# Patient Record
Sex: MALE | Race: WHITE | Employment: UNEMPLOYED | URBAN - NONMETROPOLITAN AREA
[De-identification: names, ages, dates, MRNs, and addresses within clinical notes are randomized per-mention and may not be internally consistent; named-entity substitution may affect disease eponyms.]

---

## 2020-03-30 ENCOUNTER — HOSPITAL ENCOUNTER (EMERGENCY)
Age: 25
Discharge: HOME | End: 2020-03-30
Payer: MEDICARE

## 2020-03-30 VITALS
HEART RATE: 97 BPM | DIASTOLIC BLOOD PRESSURE: 74 MMHG | BODY MASS INDEX: 21 KG/M2 | RESPIRATION RATE: 18 BRPM | TEMPERATURE: 98.2 F | HEIGHT: 71 IN | WEIGHT: 150 LBS | SYSTOLIC BLOOD PRESSURE: 132 MMHG | OXYGEN SATURATION: 94 %

## 2020-03-30 PROCEDURE — 99203 OFFICE O/P NEW LOW 30 MIN: CPT | Performed by: NURSE PRACTITIONER

## 2020-03-30 PROCEDURE — 99202 OFFICE O/P NEW SF 15 MIN: CPT

## 2020-03-30 RX ORDER — AZITHROMYCIN 250 MG/1
TABLET, FILM COATED ORAL
Qty: 6 TABLET | Refills: 0 | Status: SHIPPED | OUTPATIENT
Start: 2020-03-30

## 2020-03-30 RX ORDER — PREDNISONE 20 MG/1
40 TABLET ORAL DAILY
Qty: 10 TABLET | Refills: 0 | Status: SHIPPED | OUTPATIENT
Start: 2020-03-30 | End: 2020-04-04

## 2020-03-30 RX ORDER — ALBUTEROL SULFATE 90 UG/1
2 AEROSOL, METERED RESPIRATORY (INHALATION) EVERY 6 HOURS PRN
Qty: 1 INHALER | Refills: 0 | Status: SHIPPED | OUTPATIENT
Start: 2020-03-30

## 2020-03-30 ASSESSMENT — PAIN DESCRIPTION - FREQUENCY: FREQUENCY: INTERMITTENT

## 2020-03-30 ASSESSMENT — PAIN DESCRIPTION - LOCATION: LOCATION: GENERALIZED

## 2020-03-30 ASSESSMENT — PAIN DESCRIPTION - PROGRESSION: CLINICAL_PROGRESSION: NOT CHANGED

## 2020-03-30 ASSESSMENT — PAIN DESCRIPTION - DESCRIPTORS: DESCRIPTORS: ACHING

## 2020-03-30 ASSESSMENT — ENCOUNTER SYMPTOMS
COUGH: 1
RHINORRHEA: 1
SINUS PRESSURE: 0
SHORTNESS OF BREATH: 1
NAUSEA: 0
SORE THROAT: 0
DIARRHEA: 0
VOMITING: 0

## 2020-03-30 ASSESSMENT — PAIN DESCRIPTION - ONSET: ONSET: AWAKENED FROM SLEEP

## 2020-03-30 ASSESSMENT — PAIN DESCRIPTION - PAIN TYPE: TYPE: ACUTE PAIN

## 2020-03-30 ASSESSMENT — PAIN - FUNCTIONAL ASSESSMENT: PAIN_FUNCTIONAL_ASSESSMENT: ACTIVITIES ARE NOT PREVENTED

## 2020-03-30 ASSESSMENT — PAIN SCALES - GENERAL: PAINLEVEL_OUTOF10: 8

## 2020-03-30 NOTE — ED PROVIDER NOTES
reports that he has quit smoking. His smoking use included cigarettes. He has never used smokeless tobacco. He reports that he does not drink alcohol or use drugs. PHYSICAL EXAM     ED TRIAGE VITALS  BP: 132/74, Temp: 98.2 °F (36.8 °C), Pulse: 97, Resp: 18, SpO2: 94 %,Estimated body mass index is 20.92 kg/m² as calculated from the following:    Height as of this encounter: 5' 11\" (1.803 m). Weight as of this encounter: 150 lb (68 kg). ,No LMP for male patient. Physical Exam  Vitals signs and nursing note reviewed. Constitutional:       General: He is not in acute distress. Appearance: He is well-developed. He is not ill-appearing. HENT:      Head: Normocephalic and atraumatic. Right Ear: Tympanic membrane and ear canal normal.      Left Ear: Tympanic membrane and ear canal normal.      Nose: Congestion (Mild) present. Right Sinus: No maxillary sinus tenderness or frontal sinus tenderness. Left Sinus: No maxillary sinus tenderness or frontal sinus tenderness. Mouth/Throat:      Lips: Pink. Mouth: Mucous membranes are moist.      Pharynx: Uvula midline. No pharyngeal swelling, oropharyngeal exudate or posterior oropharyngeal erythema. Eyes:      General: Lids are normal. No scleral icterus. Conjunctiva/sclera: Conjunctivae normal.      Pupils: Pupils are equal.   Cardiovascular:      Rate and Rhythm: Normal rate and regular rhythm. Heart sounds: Normal heart sounds, S1 normal and S2 normal.   Pulmonary:      Effort: Pulmonary effort is normal. No respiratory distress. Breath sounds: Wheezing (Coarse, throughout) present. Musculoskeletal:      Comments: Normal active ROM x 4 extremities  Gait steady   Lymphadenopathy:      Comments: No head or neck adenopathy   Skin:     General: Skin is warm and dry. Findings: No rash (to exposed skin). Nails: There is no clubbing. Neurological:      General: No focal deficit present.       Mental Status: He is List as of 3/30/2020  1:54 PM          Discharge Medication List as of 3/30/2020  1:54 PM          DELLA Ferrera CNP    (Please note that portions of this note were completed with a voice recognition program. Efforts were made to edit the dictations but occasionally words are mis-transcribed.)         DELLA Ferrera - ARA  03/30/20 [id number]

## 2022-11-14 ENCOUNTER — TELEPHONE (OUTPATIENT)
Dept: FAMILY MEDICINE CLINIC | Age: 27
End: 2022-11-14

## 2022-11-15 ENCOUNTER — OFFICE VISIT (OUTPATIENT)
Dept: FAMILY MEDICINE CLINIC | Age: 27
End: 2022-11-15
Payer: MEDICARE

## 2022-11-15 VITALS
OXYGEN SATURATION: 98 % | SYSTOLIC BLOOD PRESSURE: 128 MMHG | WEIGHT: 194 LBS | HEART RATE: 82 BPM | DIASTOLIC BLOOD PRESSURE: 58 MMHG | HEIGHT: 71 IN | BODY MASS INDEX: 27.16 KG/M2

## 2022-11-15 DIAGNOSIS — M54.42 CHRONIC MIDLINE LOW BACK PAIN WITH BILATERAL SCIATICA: ICD-10-CM

## 2022-11-15 DIAGNOSIS — R45.89 DYSPHORIC MOOD: ICD-10-CM

## 2022-11-15 DIAGNOSIS — M54.41 CHRONIC MIDLINE LOW BACK PAIN WITH BILATERAL SCIATICA: ICD-10-CM

## 2022-11-15 DIAGNOSIS — F51.04 PSYCHOPHYSIOLOGICAL INSOMNIA: ICD-10-CM

## 2022-11-15 DIAGNOSIS — F41.9 ANXIETY: Primary | ICD-10-CM

## 2022-11-15 DIAGNOSIS — G89.29 CHRONIC MIDLINE LOW BACK PAIN WITH BILATERAL SCIATICA: ICD-10-CM

## 2022-11-15 PROCEDURE — G8419 CALC BMI OUT NRM PARAM NOF/U: HCPCS | Performed by: NURSE PRACTITIONER

## 2022-11-15 PROCEDURE — 4004F PT TOBACCO SCREEN RCVD TLK: CPT | Performed by: NURSE PRACTITIONER

## 2022-11-15 PROCEDURE — G8484 FLU IMMUNIZE NO ADMIN: HCPCS | Performed by: NURSE PRACTITIONER

## 2022-11-15 PROCEDURE — G8427 DOCREV CUR MEDS BY ELIG CLIN: HCPCS | Performed by: NURSE PRACTITIONER

## 2022-11-15 PROCEDURE — 99204 OFFICE O/P NEW MOD 45 MIN: CPT | Performed by: NURSE PRACTITIONER

## 2022-11-15 RX ORDER — TRAZODONE HYDROCHLORIDE 50 MG/1
50 TABLET ORAL NIGHTLY
Qty: 30 TABLET | Refills: 0 | Status: SHIPPED | OUTPATIENT
Start: 2022-11-15 | End: 2022-12-15

## 2022-11-15 RX ORDER — CITALOPRAM 20 MG/1
20 TABLET ORAL EVERY MORNING
Qty: 30 TABLET | Refills: 0 | Status: SHIPPED | OUTPATIENT
Start: 2022-11-15 | End: 2022-12-15

## 2022-11-15 SDOH — ECONOMIC STABILITY: FOOD INSECURITY: WITHIN THE PAST 12 MONTHS, YOU WORRIED THAT YOUR FOOD WOULD RUN OUT BEFORE YOU GOT MONEY TO BUY MORE.: NEVER TRUE

## 2022-11-15 SDOH — ECONOMIC STABILITY: FOOD INSECURITY: WITHIN THE PAST 12 MONTHS, THE FOOD YOU BOUGHT JUST DIDN'T LAST AND YOU DIDN'T HAVE MONEY TO GET MORE.: NEVER TRUE

## 2022-11-15 ASSESSMENT — PATIENT HEALTH QUESTIONNAIRE - PHQ9
7. TROUBLE CONCENTRATING ON THINGS, SUCH AS READING THE NEWSPAPER OR WATCHING TELEVISION: 0
9. THOUGHTS THAT YOU WOULD BE BETTER OFF DEAD, OR OF HURTING YOURSELF: 0
SUM OF ALL RESPONSES TO PHQ QUESTIONS 1-9: 9
SUM OF ALL RESPONSES TO PHQ QUESTIONS 1-9: 9
6. FEELING BAD ABOUT YOURSELF - OR THAT YOU ARE A FAILURE OR HAVE LET YOURSELF OR YOUR FAMILY DOWN: 0
8. MOVING OR SPEAKING SO SLOWLY THAT OTHER PEOPLE COULD HAVE NOTICED. OR THE OPPOSITE, BEING SO FIGETY OR RESTLESS THAT YOU HAVE BEEN MOVING AROUND A LOT MORE THAN USUAL: 1
1. LITTLE INTEREST OR PLEASURE IN DOING THINGS: 1
SUM OF ALL RESPONSES TO PHQ9 QUESTIONS 1 & 2: 3
2. FEELING DOWN, DEPRESSED OR HOPELESS: 2
3. TROUBLE FALLING OR STAYING ASLEEP: 1
5. POOR APPETITE OR OVEREATING: 3
4. FEELING TIRED OR HAVING LITTLE ENERGY: 1
SUM OF ALL RESPONSES TO PHQ QUESTIONS 1-9: 9
SUM OF ALL RESPONSES TO PHQ QUESTIONS 1-9: 9
10. IF YOU CHECKED OFF ANY PROBLEMS, HOW DIFFICULT HAVE THESE PROBLEMS MADE IT FOR YOU TO DO YOUR WORK, TAKE CARE OF THINGS AT HOME, OR GET ALONG WITH OTHER PEOPLE: 1

## 2022-11-15 ASSESSMENT — ENCOUNTER SYMPTOMS
SINUS PRESSURE: 0
CONSTIPATION: 0
EYE DISCHARGE: 0
ABDOMINAL PAIN: 0
SHORTNESS OF BREATH: 0
BLOOD IN STOOL: 0
EYE REDNESS: 0
EYE PAIN: 0
EYE ITCHING: 0
COLOR CHANGE: 0
BACK PAIN: 1
DIARRHEA: 0
COUGH: 0
WHEEZING: 0

## 2022-11-15 ASSESSMENT — ANXIETY QUESTIONNAIRES
4. TROUBLE RELAXING: 3-NEARLY EVERY DAY
2. NOT BEING ABLE TO STOP OR CONTROL WORRYING: 3-NEARLY EVERY DAY
GAD7 TOTAL SCORE: 17
6. BECOMING EASILY ANNOYED OR IRRITABLE: 3-NEARLY EVERY DAY
3. WORRYING TOO MUCH ABOUT DIFFERENT THINGS: 3-NEARLY EVERY DAY
1. FEELING NERVOUS, ANXIOUS, OR ON EDGE: 2
7. FEELING AFRAID AS IF SOMETHING AWFUL MIGHT HAPPEN: 0-NOT AT ALL
5. BEING SO RESTLESS THAT IT IS HARD TO SIT STILL: 3-NEARLY EVERY DAY

## 2022-11-15 ASSESSMENT — SOCIAL DETERMINANTS OF HEALTH (SDOH): HOW HARD IS IT FOR YOU TO PAY FOR THE VERY BASICS LIKE FOOD, HOUSING, MEDICAL CARE, AND HEATING?: NOT VERY HARD

## 2022-11-15 NOTE — PROGRESS NOTES
[facility]  [facility]  [address] [facility]  Dept: [phone]  Dept Fax: [phone]: [phone]     Visit Date:  11/15/2022    Patient:  Latonia Stone  YOB: 1995  Age: 32 y.o. Gender: male  BMI: Body mass index is 27.06 kg/m². Latonia Stone, New patient, is being seen today for   Chief Complaint   Patient presents with    Established New Doctor     Back pain, bipolar disorder    . Assessment/Plan      1. Anxiety  - New  - Start ssri treatment for treatment of anxiety  - Encouraged initiation of counseling services- plans to contact [facility]  - Medication side effects discussed with patient. If SI is to arise, patient is to d/c medication immediately and to contact a family member/ friend/ or suicide hotline. Patient is also to contact the office to notify us of medication side effects. - citalopram (CELEXA) 20 MG tablet; Take 1 tablet by mouth every morning  Dispense: 30 tablet; Refill: 0    2. Dysphoric mood  - New  - Start ssri treatment and trazodone for treatment of dysphoric mood  - Encouraged initiation of counseling services- plans to contact [facility]  - Medication side effects discussed with patient. If SI is to arise, patient is to d/c medication immediately and to contact a family member/ friend/ or suicide hotline. Patient is also to contact the office to notify us of medication side effects. - citalopram (CELEXA) 20 MG tablet; Take 1 tablet by mouth every morning  Dispense: 30 tablet; Refill: 0  - traZODone (DESYREL) 50 MG tablet; Take 1 tablet by mouth nightly  Dispense: 30 tablet; Refill: 0    3. Psychophysiological insomnia  - Start trazodone for treatment of insomnia  - Sleep maintenance strategies encouraged  - traZODone (DESYREL) 50 MG tablet; Take 1 tablet by mouth nightly  Dispense: 30 tablet; Refill: 0    4.  Chronic midline low back pain with bilateral sciatica  - Chronic  - Further evaluate with imaging  - PT ordered  - NSAID and acetaminophen treatment as needed for pain  - XR LUMBAR SPINE (2-3 VIEWS); Future  - [facility] [place]    Return in about 1 month (around 12/15/2022) for Depression, Anxiety. HPI:     Patient presents today for a new patient appointment. No recent pcp. Health maintenance reviewed. Medical history is negative. No current specialists. Current concerns include back pain and psychiatric conditions. Feels overwhelmed at times. Anxiety. Talks to his significant other which helps at times. Symptoms are worse in the afternoon. Inciting events or triggers for anxiety - always present   Frequency of anxiety - daily  Panic attacks? Yes  Symptoms of panic attacks -  feelings of losing control, irritable, psychomotor agitation, and racing thoughts  Sleep Disturbances? Yes  Impaired concentration? Yes  Substance abuse? Yes - marijuana use for relief of anxiety symptoms  Suicidal/Homicidal Ideation? No  Sees therapist?: No  Family History of Mental Illness? Yes - mother    Chronic back pain. Midline. Lower back. Sharp. Spasms. 8/10. Using tylenol and ibuprofen. No previous injuries. Pain is worsening. Using marijuana to help with pain. PHQ Scores 11/15/2022   PHQ2 Score 3   PHQ9 Score 9     Interpretation of Total Score Depression Severity: 1-4 = Minimal depression, 5-9 = Mild depression, 10-14 = Moderate depression, 15-19 = Moderately severe depression, 20-27 = Severe depression    FABIAN 7 SCORE 11/15/2022   FABIAN-7 Total Score 17     Interpretation of FABIAN-7 score: 5-9 = mild anxiety, 10-14 = moderate anxiety, 15+ = severe anxiety. Recommend referral to behavioral health for scores 10 or greater.       Medications    Current Outpatient Medications:     citalopram (CELEXA) 20 MG tablet, Take 1 tablet by mouth every morning, Disp: 30 tablet, Rfl: 0    traZODone (DESYREL) 50 MG tablet, Take 1 tablet by mouth nightly, Disp: 30 tablet, Rfl: 0    The patient has No Known Allergies. Past Medical History  Jihan Islas  has no past medical history on file. Past Surgical History  The patient  has a past surgical history that includes hernia repair (2009). Family History  This patient's family history includes COPD in his mother; Diabetes in his sister; Heart Disease in his father and mother. Social History  Jihan Islas  reports that he has been smoking cigarettes. He has been smoking an average of .5 packs per day. He has never used smokeless tobacco. He reports current drug use. Frequency: 7.00 times per week. Drug: Marijuana [place]). He reports that he does not drink alcohol. Health Maintenance:    Health maintenance reviewed. Health Maintenance   Topic Date Due    COVID-19 Vaccine (1) Never done    Varicella vaccine (1 of 2 - 2-dose childhood series) Never done    Depression Screen  Never done    HIV screen  Never done    Hepatitis C screen  Never done    DTaP/Tdap/Td vaccine (1 - Tdap) Never done    Flu vaccine (1) Never done    Hepatitis A vaccine  Aged Out    Hib vaccine  Aged Out    Meningococcal (ACWY) vaccine  Aged Out    Pneumococcal 0-64 years Vaccine  Aged Out       Subjective/Objective:      Review of Systems   Constitutional:  Negative for chills, fatigue and fever. HENT:  Negative for congestion, ear pain, sinus pressure and sneezing. Eyes:  Negative for pain, discharge, redness and itching. Respiratory:  Negative for cough, shortness of breath and wheezing. Cardiovascular:  Negative for chest pain, palpitations and leg swelling. Gastrointestinal:  Negative for abdominal pain, blood in stool, constipation and diarrhea. Endocrine: Negative for polydipsia, polyphagia and polyuria. Genitourinary:  Negative for difficulty urinating and hematuria. Musculoskeletal:  Positive for back pain. Negative for arthralgias and neck pain. Skin:  Negative for color change, pallor and rash.    Allergic/Immunologic: Negative for environmental allergies and food allergies. Neurological:  Negative for dizziness, light-headedness, numbness and headaches. Psychiatric/Behavioral:  Positive for agitation, decreased concentration, dysphoric mood and sleep disturbance. Negative for confusion, self-injury and suicidal ideas. The patient is nervous/anxious. BP (!) 128/58   Pulse 82   Ht 5' 11\" (1.803 m)   Wt 194 lb (88 kg)   SpO2 98%   BMI 27.06 kg/m²     Physical Exam  Vitals and nursing note reviewed. Constitutional:       General: He is awake. Appearance: Normal appearance. HENT:      Head: Normocephalic and atraumatic. Right Ear: Hearing and external ear normal.      Left Ear: Hearing and external ear normal.      Nose: Nose normal. No congestion or rhinorrhea. Eyes:      General: Lids are normal.         Right eye: No discharge. Left eye: No discharge. Conjunctiva/sclera: Conjunctivae normal.   Neck:      Trachea: No tracheal deviation. Cardiovascular:      Rate and Rhythm: Normal rate and regular rhythm. Heart sounds: Normal heart sounds. No murmur heard. Pulmonary:      Effort: Pulmonary effort is normal. No respiratory distress. Breath sounds: No stridor. No wheezing. Musculoskeletal:      Cervical back: Full passive range of motion without pain. Lumbar back: Tenderness present. No deformity or bony tenderness. Back:    Skin:     General: Skin is dry. Coloration: Skin is not jaundiced or pale. Neurological:      General: No focal deficit present. Mental Status: He is alert. Mental status is at baseline. Psychiatric:         Mood and Affect: Affect normal. Mood is anxious. Behavior: Behavior is cooperative. Thought Content: Thought content does not include suicidal ideation.          Labs Reviewed 11/15/2022:    Lab Results   Component Value Date    WBC 6.1 01/18/2015    HGB 14.4 01/18/2015    HCT 42.8 01/18/2015     01/18/2015    CHOL 117 10/06/2011 TRIG 38 10/06/2011    HDL 40 10/06/2011    ALT 21 01/18/2015    AST 21 01/18/2015     01/18/2015    K 4.1 01/18/2015     01/18/2015    CREATININE 0.5 01/18/2015    BUN 11 01/18/2015    CO2 29 01/18/2015    TSH 1.510 01/18/2015           Patient given educational materials - see patient instructions. Discussed use, benefit, and side effects of prescribed medications. All patient questions answered. Pt voiced understanding. Reviewed health maintenance.        Electronically signed by DELLA Higgins CNP on 11/15/2022 at 2:37 PM EST

## 2022-11-16 ENCOUNTER — HOSPITAL ENCOUNTER (OUTPATIENT)
Dept: GENERAL RADIOLOGY | Age: 27
Discharge: HOME | End: 2022-11-16
Payer: MEDICARE

## 2022-11-16 ENCOUNTER — TELEPHONE (OUTPATIENT)
Dept: FAMILY MEDICINE CLINIC | Age: 27
End: 2022-11-16

## 2022-11-16 ENCOUNTER — HOSPITAL ENCOUNTER (OUTPATIENT)
Age: 27
Discharge: HOME | End: 2022-11-16
Payer: MEDICARE

## 2022-11-16 DIAGNOSIS — G89.29 CHRONIC MIDLINE LOW BACK PAIN WITH BILATERAL SCIATICA: ICD-10-CM

## 2022-11-16 DIAGNOSIS — M54.41 CHRONIC MIDLINE LOW BACK PAIN WITH BILATERAL SCIATICA: ICD-10-CM

## 2022-11-16 DIAGNOSIS — M54.42 CHRONIC MIDLINE LOW BACK PAIN WITH BILATERAL SCIATICA: ICD-10-CM

## 2022-11-16 PROCEDURE — 72100 X-RAY EXAM L-S SPINE 2/3 VWS: CPT

## 2022-11-16 NOTE — TELEPHONE ENCOUNTER
----- Message from Romana Roads, APRN - CNP sent at 11/16/2022  1:16 PM EST -----  No acute fracture or malalignment noted on the completed xray. Did mention lower lumbar facet arthrosis at L5-S1 with suggested possible neuroforaminal narrowing at L5-S1. This likely is contributing to pain. Continue with current POC. Consider ortho referral if no improvement in symptoms.

## 2022-12-14 ENCOUNTER — TELEPHONE (OUTPATIENT)
Dept: FAMILY MEDICINE CLINIC | Age: 27
End: 2022-12-14

## 2022-12-14 ENCOUNTER — HOSPITAL ENCOUNTER (OUTPATIENT)
Dept: PHYSICAL THERAPY | Age: 27
Setting detail: THERAPIES SERIES
Discharge: HOME | End: 2022-12-14
Payer: MEDICARE

## 2022-12-14 DIAGNOSIS — F51.04 PSYCHOPHYSIOLOGICAL INSOMNIA: ICD-10-CM

## 2022-12-14 DIAGNOSIS — R45.89 DYSPHORIC MOOD: ICD-10-CM

## 2022-12-14 DIAGNOSIS — F41.9 ANXIETY: ICD-10-CM

## 2022-12-14 PROCEDURE — 97110 THERAPEUTIC EXERCISES: CPT

## 2022-12-14 PROCEDURE — 97162 PT EVAL MOD COMPLEX 30 MIN: CPT

## 2022-12-14 PROCEDURE — G0283 ELEC STIM OTHER THAN WOUND: HCPCS

## 2022-12-14 RX ORDER — CITALOPRAM 20 MG/1
20 TABLET ORAL EVERY MORNING
Qty: 7 TABLET | Refills: 0 | Status: SHIPPED | OUTPATIENT
Start: 2022-12-14 | End: 2022-12-21

## 2022-12-14 RX ORDER — TRAZODONE HYDROCHLORIDE 50 MG/1
50 TABLET ORAL NIGHTLY
Qty: 7 TABLET | Refills: 0 | Status: SHIPPED | OUTPATIENT
Start: 2022-12-14 | End: 2022-12-21

## 2022-12-14 NOTE — TELEPHONE ENCOUNTER
Patient came in stating that he needs a refill of his CELEXA 20 mg sent to [facility] in [place]. He will be out before his appointment on Monday he only has 4 left. Also he stated that his TRAZODONE 50 MG works but it doesn't help him sleep all night. He is wanting to know if that could maybe be upped.

## 2022-12-14 NOTE — PROGRESS NOTES
** PLEASE SIGN, DATE AND TIME CERTIFICATION BELOW AND RETURN TO [facility] OUTPATIENT REHABILITATION (FAX #: [phone]). ATTEST/CO-SIGN IF ACCESSING VIA [employer]. THANK YOU.**    I certify that I have examined the patient below and determined that Physical Medicine and Rehabilitation service is necessary and that I approve the established plan of care for up to 90 days or as specifically noted. Attestation, signature or co-signature of physician indicates approval of certification requirements.    ________________________ ____________ __________  Physician Signature   Date   Time   [address] [facility]  PHYSICAL THERAPY  [x] EVALUATION  [] DAILY NOTE (LAND) [] DAILY NOTE (AQUATIC ) [] PROGRESS NOTE [] DISCHARGE NOTE    [] [address] [place] [address]   [] [place] [address]    [] [facility]   [] [facility] See    Date: 2022  Patient Name:  Karina Zheng  : 1995  MRN: [id number]  CSN: [id number]    Referring Practitioner DELLA Reece*   Diagnosis Chronic midline low back pain with bilateral sciatica   Treatment Diagnosis LBP, difficulty walking   Date of Evaluation 22   Additional Pertinent History Negative, LBP x 5 years      Functional Outcome Measure Used Oswestry back disability scale   Functional Outcome Score Eval score 33 (22)       Insurance: Primary: Payor: Josef Gomez /  /  / ,   Secondary:    Authorization Information: INSURANCE PAYS AT: 100%               PATIENT RESPONSIBILITY AND/OR CO-PAY: n/a  SECONDARY INSURANCE COMPANY: NONE      PRECERTIFICATION REQUIRED:  n/a  INSURANCE THERAPY BENEFIT:  Allowed 30 visits Physical Therapy /Occupational Therapy/Speech Therapy per calendar year. No visit limit for PT/OT/ST for patients age 8 and under. FCE-Covered, no precert required. Benefit will not cover maintenance or preventative treatment. AQUATIC THERAPY COVERED:   Yes  MODALITIES COVERED:  Yes. Iontophoresis and Hot/Cold Packs are not covered. Visit # 1, 1/10 for progress note   Visits Allowed: 30   Recertification Date: 1/56/45   Physician Follow-Up: Needs to schedule f/u with Rony Mari   Physician Orders:    History of Present Illness: 5 year history of LBP with insidious onset with patient unsure of onset or cause, reports \"I was doing a lot of drugs, went to group home \". Patient out of group home Jan 14, 2022. Patient started see Cici Head as family MD, due to back pain and depression. Xray for LBP. Patient reports back pain is severe in morning, pain awakens patient 2-3 x per night. Increased pain after walking 30 minutes, increased pain getting in/out car, increased pain bending over to put socks and shoes on . SUBJECTIVE: see above    Social/Functional History and Current Status:  Medications and Allergies have been reviewed and are listed on Medical History Questionnaire. Marcelino Infante lives with significant other in a single story home with stairs and a handrail to enter.     Task Previous Current   ADLs  Independent Modified Independent increased pain bending over to put socks and shoes on   IADL's Independent Modified Independent severe pain with walking x 10 minutes   Ambulation Independent Modified Independent   Transfers Independent Modified Independent  increased pain getting in/out of car   Recreation Independent Modified Independent enjoys playing video games, does help clean house or garage   [address] [facility]  Does not drive does not have liscence since in group home   Work Unemployed  Unemployed       OBJECTIVE:  Pain % of the day , 10/10, average 7/10   Palpation    Observation    Posture fair        Range of Motion Back: lumbar flexion 75%, extension 25% and did not want to move this direction, lateral flexion B limited by 50%  Hip: B hip flexion 40 with LBP  Knee: B knee 0 -120 degrees with pain with flexion  Ankle: B ankle DF 10, PF 40 degrees    Strength Right Lower Extremity:  Impaired - hip 3/5 with LBP with MMT, knee 4-/5 with LBP with MMT, ankle 4-/5 with LBP with MMT with poor abdominal strength noted  Left Lower Extremity:   Impaired - hip 3/5, knee and ankle 4-/5   Coordination WFL   Sensation WFL   Bed Mobility Impaired - eduction on log rolling   Transfers WFL   Ambulation Modified Independent  Distance: 100 feet  Surface: Level Tile  Device:No Device  Gait Deviations: Forward Flexed Posture, Slow Teri, and Decreased Trunk Rotation   Balance Tinetti: 24/28   Special Tests Repeated lumbar flexion standing increased LBP to 8/10, refused extension, negative Cristopher test           TREATMENT   Precautions:  Ionto/MHP/CP not covered by insurance   Pain:     \"X in shaded column indicates activity completed today    *\" next to exercise/intervention indicates progression   Modalities Parameters/  Location  Notes   IFC 2 channels miriam crossed at lumbar spine, supine, knees on bolster 8 minutes, 6 mA intensity x                Manual Therapy Time/Technique  Notes                     Exercise/Intervention   Notes   Abdominal bracing knees on bolster 5x 5s x    Abdominal bracing with quad set R/L, opposite knee bent 5x 5s x    SAQ alternating legs 5x 5s x    Education on posture with lumbar lordosis   x    Education on log rolling   x                                                Specific Interventions Next Treatment: IFC for pain control, DLSP in neutral position supine and seated, progress to light hamstring stretch seated in chair- avoid increased pain, progress to NuStep and standing exercises after 2 weeks    Activity/Treatment Tolerance:  []  Patient tolerated treatment well  []  Patient limited by fatigue  [x]  Patient limited by pain   []  Patient limited by medical complications  []  Other:     Assessment: PT for exercises and modalities to increase AROM, strength, and lessen LBP  Body Structures/Functions/Activity Limitations: impaired ROM, impaired strength, and pain  Prognosis: good    GOALS:  Patient Goal: to get my pain to go away    Short Term Goals:  Time Frame: deferred to LTG's    Long Term Goals:  Time Frame: 5 weeks  Increase lumbar spine to 75%, hip flexion to 60, knee flexion to 130 degrees to allow patient to grocery shop x 20 minutes with decreased pain to 3/10  Increase abdominal strength to fair, LE to 4-/5 to allow patient to report able to dress and shower with decreased pain to 3/10  I with HEP as prescribed to allow patient to report able to sleep through night without awakening due to LBP    Patient Education:   [x]  HEP/Education Completed: Plan of Care, Goals, handout with above exercises given  [email or web address] Access Code:  []  No new Education completed  []  Reviewed Prior HEP      []  Patient verbalized and/or demonstrated understanding of education provided. []  Patient unable to verbalize and/or demonstrate understanding of education provided. Will continue education. [x]  Barriers to learning: give handouts, poor memory admitted    PLAN:  Treatment Recommendations: Strengthening, Range of Motion, Gait Training, Pain Management, Home Exercise Program, Patient Education, and Modalities    [x]  Plan of care initiated. Plan to see patient 2 times per week for 5 weeks to address the treatment planned outlined above.   []  Continue with current plan of care  []  Modify plan of care as follows:    []  Hold pending physician visit  []  Discharge    Time In 1410   Time Out 1500   Timed Code Minutes: 15 min   Total Treatment Time: 50 min       Electronically Signed by: Ayo Kelly PT

## 2022-12-16 ENCOUNTER — HOSPITAL ENCOUNTER (OUTPATIENT)
Dept: PHYSICAL THERAPY | Age: 27
Setting detail: THERAPIES SERIES
Discharge: HOME | End: 2022-12-16
Payer: MEDICARE

## 2022-12-16 PROCEDURE — 97110 THERAPEUTIC EXERCISES: CPT

## 2022-12-16 NOTE — PROGRESS NOTES
[address] [facility]  PHYSICAL THERAPY  [] EVALUATION  [x] DAILY NOTE (LAND) [] DAILY NOTE (AQUATIC ) [] PROGRESS NOTE [] DISCHARGE NOTE    [] OUTPATIENT REHABILITATION [facility]   [] [place] [address]    [] [facility]   [] Bernadette Chintan    Date: 2022  Patient Name:  Silver Paiz  : 1995  MRN: [id number]  CSN: [id number]    Referring Practitioner DELLA Nagel*   Diagnosis Chronic midline low back pain with bilateral sciatica   Treatment Diagnosis LBP, difficulty walking   Date of Evaluation 22   Additional Pertinent History Negative, LBP x 5 years      Functional Outcome Measure Used Oswestry back disability scale   Functional Outcome Score Eval score 33 (22)       Insurance: Primary: Payor: Agapito Foster /  /  / ,   Secondary:    Authorization Information: INSURANCE PAYS AT: 100%               PATIENT RESPONSIBILITY AND/OR CO-PAY: n/a  SECONDARY INSURANCE COMPANY: NONE      PRECERTIFICATION REQUIRED:  n/a  INSURANCE THERAPY BENEFIT:  Allowed 30 visits Physical Therapy /Occupational Therapy/Speech Therapy per calendar year. No visit limit for PT/OT/ST for patients age 8 and under. FCE-Covered, no precert required. Benefit will not cover maintenance or preventative treatment. AQUATIC THERAPY COVERED:   Yes  MODALITIES COVERED:  Yes. Iontophoresis and Hot/Cold Packs are not covered. Visit # 2, 2/10 for progress note   Visits Allowed: 30   Recertification Date:    Physician Follow-Up: Needs to schedule f/u with Nuno Baeza   Physician Orders:    History of Present Illness: 5 year history of LBP with insidious onset with patient unsure of onset or cause, reports \"I was doing a lot of drugs, went to residential \". Patient out of residential 2022. Patient started see Jolene Phelps as family MD, due to back pain and depression. Xray for LBP. Patient reports back pain is severe in morning, pain awakens patient 2-3 x per night. Increased pain after walking 30 minutes, increased pain getting in/out car, increased pain bending over to put socks and shoes on . SUBJECTIVE: Subjective reports of bilateral lower extremity numbness and moderate low back pain, rates 6/10 upon initial patient interview. OBJECTIVE:    TREATMENT   Precautions: Ionto/MHP/CP not covered by insurance   Pain:     \"X in shaded column indicates activity completed today    *\" next to exercise/intervention indicates progression   Modalities Parameters/  Location  Notes   IFC 2 channels miriam crossed at lumbar spine, supine, knees on bolster 8 minutes, 6 mA intensity X                Manual Therapy Time/Technique  Notes                     Exercise/Intervention   Notes   Abdominal bracing knees on bolster 10x 5s X    Abdominal bracing with quad set R/L, opposite knee bent 10x 5s X    SAQ alternating legs 10x 5s X    Education on posture with lumbar lordosis       Education on log rolling                                                   Specific Interventions Next Treatment: IFC for pain control, DLSP in neutral position supine and seated, progress to light hamstring stretch seated in chair- avoid increased pain, progress to NuStep and standing exercises after 2 weeks    Activity/Treatment Tolerance:  []  Patient tolerated treatment well  []  Patient limited by fatigue  [x]  Patient limited by pain   []  Patient limited by medical complications  []  Other:     Assessment: Treatment interventions completed as recorded above. Additional interventions marked by * in chart. Patient tolerated treatment well.     GOALS:  Patient Goal: to get my pain to go away    Short Term Goals:  Time Frame: deferred to LTG's    Long Term Goals:  Time Frame: 5 weeks  Increase lumbar spine to 75%, hip flexion to 60, knee flexion to 130 degrees to allow patient to grocery shop x 20 minutes with decreased pain to 3/10  Increase abdominal strength to fair, LE to 4-/5 to allow patient to report able to dress and shower with decreased pain to 3/10  I with HEP as prescribed to allow patient to report able to sleep through night without awakening due to LBP    Patient Education:   []  HEP/Education Completed: Plan of Care, Goals, handout with above exercises given  [employer] Access Code:  [x]  No new Education completed  []  Reviewed Prior HEP      []  Patient verbalized and/or demonstrated understanding of education provided. []  Patient unable to verbalize and/or demonstrate understanding of education provided. Will continue education. [x]  Barriers to learning: give handouts, poor memory admitted    PLAN:  Treatment Recommendations: Strengthening, Range of Motion, Gait Training, Pain Management, Home Exercise Program, Patient Education, and Modalities    []  Plan of care initiated. Plan to see patient 2 times per week for 5 weeks to address the treatment planned outlined above.   [x]  Continue with current plan of care  []  Modify plan of care as follows:    []  Hold pending physician visit  []  Discharge    Time In 1530   Time Out 1600   Timed Code Minutes: 30 min   Total Treatment Time: 30 min       Electronically Signed by: Sheree Arango PTA

## 2022-12-19 ENCOUNTER — OFFICE VISIT (OUTPATIENT)
Dept: FAMILY MEDICINE CLINIC | Age: 27
End: 2022-12-19
Payer: MEDICARE

## 2022-12-19 VITALS
DIASTOLIC BLOOD PRESSURE: 82 MMHG | OXYGEN SATURATION: 98 % | BODY MASS INDEX: 26.5 KG/M2 | WEIGHT: 190 LBS | SYSTOLIC BLOOD PRESSURE: 128 MMHG | HEART RATE: 62 BPM

## 2022-12-19 DIAGNOSIS — R45.89 DYSPHORIC MOOD: ICD-10-CM

## 2022-12-19 DIAGNOSIS — F41.9 ANXIETY: Primary | ICD-10-CM

## 2022-12-19 DIAGNOSIS — F51.04 PSYCHOPHYSIOLOGICAL INSOMNIA: ICD-10-CM

## 2022-12-19 PROCEDURE — 99214 OFFICE O/P EST MOD 30 MIN: CPT | Performed by: NURSE PRACTITIONER

## 2022-12-19 PROCEDURE — 4004F PT TOBACCO SCREEN RCVD TLK: CPT | Performed by: NURSE PRACTITIONER

## 2022-12-19 PROCEDURE — G8484 FLU IMMUNIZE NO ADMIN: HCPCS | Performed by: NURSE PRACTITIONER

## 2022-12-19 PROCEDURE — G8419 CALC BMI OUT NRM PARAM NOF/U: HCPCS | Performed by: NURSE PRACTITIONER

## 2022-12-19 PROCEDURE — G8427 DOCREV CUR MEDS BY ELIG CLIN: HCPCS | Performed by: NURSE PRACTITIONER

## 2022-12-19 RX ORDER — CITALOPRAM 20 MG/1
20 TABLET ORAL EVERY MORNING
Qty: 90 TABLET | Refills: 1 | Status: SHIPPED | OUTPATIENT
Start: 2022-12-19 | End: 2023-06-17

## 2022-12-19 RX ORDER — TRAZODONE HYDROCHLORIDE 100 MG/1
100 TABLET ORAL NIGHTLY
Qty: 30 TABLET | Refills: 5 | Status: SHIPPED | OUTPATIENT
Start: 2022-12-19 | End: 2023-06-17

## 2022-12-19 NOTE — PROGRESS NOTES
Coni Hyde (:  1995) is a 32 y.o. male,Established patient, here for evaluation of the following chief complaint(s):  Follow-up (Troubles with staying asleep, anxiety better )         ASSESSMENT/PLAN:  1. Anxiety  - Chronic, controlled  - Patient reports significant improvement in symptoms  - Continue citalopram 20 mg  -     citalopram (CELEXA) 20 MG tablet; Take 1 tablet by mouth every morning, Disp-90 tablet, R-1Normal    2. Dysphoric mood  - Chronic, controlled  - Patient reports significant improvement in symptoms  - Continue citalopram 20 mg and trazodone  -     citalopram (CELEXA) 20 MG tablet; Take 1 tablet by mouth every morning, Disp-90 tablet, R-1Normal  -     traZODone (DESYREL) 100 MG tablet; Take 1 tablet by mouth nightly, Disp-30 tablet, R-5Normal    3. Psychophysiological insomnia  - Increase trazodone to 100 mg nightly  - Sleep maintenance strategies encouraged  -     traZODone (DESYREL) 100 MG tablet; Take 1 tablet by mouth nightly, Disp-30 tablet, R-5Normal    Return in about 6 months (around 2023) for Depression, Anxiety. Subjective   SUBJECTIVE/OBJECTIVE:  1 month follow up for anxiety, depression and insomnia. Anxiety and depression have improved. Insomnia improved but not resolved. Is taking medication at 9 pm. Goes to bed at 11 pm. Waking around 3-4 am. Unable to fall back asleep. 4 hours of sleep last night. Reports significant amount of screen time throughout the day and night. Denies medication SE. Review of Systems   Constitutional:  Negative for fever. HENT:  Positive for congestion. Psychiatric/Behavioral:  Positive for sleep disturbance. Negative for dysphoric mood. The patient is not nervous/anxious. Objective   Physical Exam  Vitals and nursing note reviewed. Constitutional:       General: He is awake. Appearance: Normal appearance. HENT:      Head: Normocephalic and atraumatic.       Right Ear: Hearing and external ear normal. Left Ear: Hearing and external ear normal.      Nose: Nose normal. No congestion or rhinorrhea. Eyes:      General: Lids are normal.         Right eye: No discharge. Left eye: No discharge. Conjunctiva/sclera: Conjunctivae normal.   Neck:      Trachea: No tracheal deviation. Cardiovascular:      Rate and Rhythm: Normal rate and regular rhythm. Heart sounds: Normal heart sounds. No murmur heard. Pulmonary:      Effort: Pulmonary effort is normal. No respiratory distress. Breath sounds: No stridor. No wheezing. Musculoskeletal:      Cervical back: Full passive range of motion without pain. Skin:     General: Skin is dry. Coloration: Skin is not jaundiced or pale. Neurological:      General: No focal deficit present. Mental Status: He is alert. Mental status is at baseline. Psychiatric:         Mood and Affect: Mood and affect normal. Mood is not anxious or depressed. Behavior: Behavior is cooperative. An electronic signature was used to authenticate this note.     --DELLA William - CNP

## 2022-12-20 ENCOUNTER — APPOINTMENT (OUTPATIENT)
Dept: PHYSICAL THERAPY | Age: 27
End: 2022-12-20
Payer: MEDICARE

## 2022-12-22 ENCOUNTER — HOSPITAL ENCOUNTER (OUTPATIENT)
Dept: PHYSICAL THERAPY | Age: 27
Setting detail: THERAPIES SERIES
Discharge: HOME | End: 2022-12-22
Payer: MEDICARE

## 2022-12-22 PROCEDURE — 97110 THERAPEUTIC EXERCISES: CPT

## 2022-12-22 NOTE — PROGRESS NOTES
[address] [facility]  PHYSICAL THERAPY  [] EVALUATION  [x] DAILY NOTE (LAND) [] DAILY NOTE (AQUATIC ) [] PROGRESS NOTE [] DISCHARGE NOTE    [] OUTPATIENT REHABILITATION [facility]   [] [place] [address]    [] [facility]   [] [facility]     Date: 2022  Patient Name:  Michelle Peck  : 1995  MRN: [id number]  CSN: [id number]    Referring Practitioner DELLA Hill*   Diagnosis Chronic midline low back pain with bilateral sciatica   Treatment Diagnosis LBP, difficulty walking   Date of Evaluation 22   Additional Pertinent History Negative, LBP x 5 years      Functional Outcome Measure Used Oswestry back disability scale   Functional Outcome Score Eval score 33 (22)       Insurance: Primary: Payor: Shamar Willingham /  /  / ,   Secondary:    Authorization Information: INSURANCE PAYS AT: 100%               PATIENT RESPONSIBILITY AND/OR CO-PAY: n/a  SECONDARY INSURANCE COMPANY: NONE      PRECERTIFICATION REQUIRED:  n/a  INSURANCE THERAPY BENEFIT:  Allowed 30 visits Physical Therapy /Occupational Therapy/Speech Therapy per calendar year. No visit limit for PT/OT/ST for patients age 8 and under. FCE-Covered, no precert required. Benefit will not cover maintenance or preventative treatment. AQUATIC THERAPY COVERED:   Yes  MODALITIES COVERED:  Yes. Iontophoresis and Hot/Cold Packs are not covered. Visit # 3, 3/10 for progress note   Visits Allowed: 30   Recertification Date:    Physician Follow-Up: Needs to schedule f/u with Edel Arteaga   Physician Orders:    History of Present Illness: 5 year history of LBP with insidious onset with patient unsure of onset or cause, reports \"I was doing a lot of drugs, [facility] \". Patient [facility] of penitentiary 2022. Patient started see Felisha Costa as family MD, due to back pain and depression. Xray for LBP. Patient reports back pain is severe in morning, pain awakens patient 2-3 x per night. Increased pain after walking 30 minutes, increased pain getting in/out car, increased pain bending over to put socks and shoes on . SUBJECTIVE: Continued subjective reports of bilateral lower extremity numbness and moderate low back pain, rates 6/10 upon initial patient interview. Patient also reported pain in anterior right ribcage. Location patient described and indicated with hand was in the Liver region. Patient advised to monitor for any changes and consult doctor if worsens. OBJECTIVE:    TREATMENT   Precautions: Ionto/MHP/CP not covered by insurance   Pain:     \"X in shaded column indicates activity completed today    *\" next to exercise/intervention indicates progression   Modalities Parameters/  Location  Notes   IFC 2 channels miriam crossed at lumbar spine, supine, knees on bolster 8 minutes, 6 mA intensity x                Manual Therapy Time/Technique  Notes                     Exercise/Intervention   Notes   Abdominal bracing knees on bolster 10x 5s x    Abdominal bracing with quad set R/L, opposite knee bent 10x 5s x    SAQ alternating legs 10x 5s x    LTR *   x    SKTC *   x                  Education on posture with lumbar lordosis       Education on log rolling                                                   Specific Interventions Next Treatment: IFC for pain control, DLSP in neutral position supine and seated, progress to light hamstring stretch seated in chair- avoid increased pain, progress to NuStep and standing exercises after 2 weeks    Activity/Treatment Tolerance:  []  Patient tolerated treatment well  []  Patient limited by fatigue  [x]  Patient limited by pain   []  Patient limited by medical complications  []  Other:     Assessment: Treatment interventions completed as recorded above. Additional interventions marked by * in chart. Patient tolerated treatment well.     GOALS:  Patient Goal: to get my pain to go away    Short Term Goals:  Time Frame: deferred to LTG's    Long Term Goals:  Time Frame: 5 weeks  Increase lumbar spine to 75%, hip flexion to 60, knee flexion to 130 degrees to allow patient to grocery shop x 20 minutes with decreased pain to 3/10  Increase abdominal strength to fair, LE to 4-/5 to allow patient to report able to dress and shower with decreased pain to 3/10  I with HEP as prescribed to allow patient to report able to sleep through night without awakening due to LBP    Patient Education:   []  HEP/Education Completed: Plan of Care, Goals, handout with above exercises given  [employer] Access Code:  [x]  No new Education completed  []  Reviewed Prior HEP      []  Patient verbalized and/or demonstrated understanding of education provided. []  Patient unable to verbalize and/or demonstrate understanding of education provided. Will continue education. [x]  Barriers to learning: give handouts, poor memory admitted    PLAN:  Treatment Recommendations: Strengthening, Range of Motion, Gait Training, Pain Management, Home Exercise Program, Patient Education, and Modalities    []  Plan of care initiated. Plan to see patient 2 times per week for 5 weeks to address the treatment planned outlined above.   [x]  Continue with current plan of care  []  Modify plan of care as follows:    []  Hold pending physician visit  []  Discharge    Time In 1400   Time Out 1430   Timed Code Minutes: 30 min   Total Treatment Time: 30 min       Electronically Signed by: Nely Wayne PTA

## 2022-12-22 NOTE — PROGRESS NOTES
[address] [facility]  PHYSICAL THERAPY  [] EVALUATION  [x] DAILY NOTE (LAND) [] DAILY NOTE (AQUATIC ) [] PROGRESS NOTE [] DISCHARGE NOTE    [] OUTPATIENT REHABILITATION [facility]   [] [place] [address]    [] [facility]   [] Roddy Morillo    Date: 2022  Patient Name:  Madyson Salazar  : 1995  MRN: [id number]  CSN: [id number]    Referring Practitioner DELLA Lai*   Diagnosis Chronic midline low back pain with bilateral sciatica   Treatment Diagnosis LBP, difficulty walking   Date of Evaluation 22   Additional Pertinent History Negative, LBP x 5 years      Functional Outcome Measure Used Oswestry back disability scale   Functional Outcome Score Eval score 33 (22)       Insurance: Primary: Payor: Ysabel Massey /  /  / ,   Secondary:    Authorization Information: INSURANCE PAYS AT: 100%               PATIENT RESPONSIBILITY AND/OR CO-PAY: n/a  SECONDARY INSURANCE COMPANY: NONE      PRECERTIFICATION REQUIRED:  n/a  INSURANCE THERAPY BENEFIT:  Allowed 30 visits Physical Therapy /Occupational Therapy/Speech Therapy per calendar year. No visit limit for PT/OT/ST for patients age 8 and under. FCE-Covered, no precert required. Benefit will not cover maintenance or preventative treatment. AQUATIC THERAPY COVERED:   Yes  MODALITIES COVERED:  Yes. Iontophoresis and Hot/Cold Packs are not covered. Visit # 3, 3/10 for progress note   Visits Allowed: 30   Recertification Date:    Physician Follow-Up: Needs to schedule f/u with Ying Billy   Physician Orders:    History of Present Illness: 5 year history of LBP with insidious onset with patient unsure of onset or cause, reports \"I was doing a lot of drugs, went to penit[facility] \". Patient out of penitentiary 2022. Patient started see Tiffanie Joshua as family MD, due to back pain and depression. Xray for LBP. Patient reports back pain is severe in morning, pain awakens patient 2-3 x per night. Increased pain after walking 30 minutes, increased pain getting in/out car, increased pain bending over to put socks and shoes on . SUBJECTIVE: Continued subjective reports of bilateral lower extremity numbness and moderate low back pain, rates 6/10 upon initial patient interview. OBJECTIVE:    TREATMENT   Precautions: Ionto/MHP/CP not covered by insurance   Pain:     \"X in shaded column indicates activity completed today    *\" next to exercise/intervention indicates progression   Modalities Parameters/  Location  Notes   IFC 2 channels miriam crossed at lumbar spine, supine, knees on bolster 8 minutes, 6 mA intensity X                Manual Therapy Time/Technique  Notes                     Exercise/Intervention   Notes   Abdominal bracing knees on bolster 10x 5s x    Abdominal bracing with quad set R/L, opposite knee bent 10x 5s x    SAQ alternating legs 10x 5s x    LTR *   x    SKTC *   x                  Education on posture with lumbar lordosis       Education on log rolling                                                   Specific Interventions Next Treatment: IFC for pain control, DLSP in neutral position supine and seated, progress to light hamstring stretch seated in chair- avoid increased pain, progress to NuStep and standing exercises after 2 weeks    Activity/Treatment Tolerance:  []  Patient tolerated treatment well  []  Patient limited by fatigue  [x]  Patient limited by pain   []  Patient limited by medical complications  []  Other:     Assessment: Treatment interventions completed as recorded above. Additional interventions marked by * in chart. Patient tolerated treatment well.     GOALS:  Patient Goal: to get my pain to go away    Short Term Goals:  Time Frame: deferred to LTG's    Long Term Goals:  Time Frame: 5 weeks  Increase lumbar spine to 75%, hip flexion to 60, knee flexion to 130 degrees to allow patient to grocery shop x 20 minutes with decreased pain to 3/10  Increase abdominal strength to fair, LE to 4-/5 to allow patient to report able to dress and shower with decreased pain to 3/10  I with HEP as prescribed to allow patient to report able to sleep through night without awakening due to LBP    Patient Education:   []  HEP/Education Completed: Plan of Care, Goals, handout with above exercises given  [employer] Access Code:  [x]  No new Education completed  []  Reviewed Prior HEP      []  Patient verbalized and/or demonstrated understanding of education provided. []  Patient unable to verbalize and/or demonstrate understanding of education provided. Will continue education. [x]  Barriers to learning: give handouts, poor memory admitted    PLAN:  Treatment Recommendations: Strengthening, Range of Motion, Gait Training, Pain Management, Home Exercise Program, Patient Education, and Modalities    []  Plan of care initiated. Plan to see patient 2 times per week for 5 weeks to address the treatment planned outlined above.   [x]  Continue with current plan of care  []  Modify plan of care as follows:    []  Hold pending physician visit  []  Discharge    Time In 1400   Time Out 1430   Timed Code Minutes: 30 min   Total Treatment Time: 30 min       Electronically Signed by: Mikal Gusman PTA

## 2022-12-28 ENCOUNTER — HOSPITAL ENCOUNTER (OUTPATIENT)
Dept: PHYSICAL THERAPY | Age: 27
Setting detail: THERAPIES SERIES
Discharge: HOME | End: 2022-12-28
Payer: MEDICARE

## 2022-12-28 PROCEDURE — 97110 THERAPEUTIC EXERCISES: CPT

## 2022-12-28 NOTE — PROGRESS NOTES
[address] [facility]  PHYSICAL THERAPY  [] EVALUATION  [x] DAILY NOTE (LAND) [] DAILY NOTE (AQUATIC ) [] PROGRESS NOTE [] DISCHARGE NOTE    [] OUTPATIENT REHABILITATION [facility]   [] [place] [address]    [] [facility]   [] [facility]    Date: 2022  Patient Name:  Mike Ochoa  : 1995  MRN: [id number]  CSN: [id number]    Referring Practitioner DELLA Leiva*   Diagnosis Chronic midline low back pain with bilateral sciatica   Treatment Diagnosis LBP, difficulty walking   Date of Evaluation 22   Additional Pertinent History Negative, LBP x 5 years      Functional Outcome Measure Used Oswestry back disability scale   Functional Outcome Score Eval score 33 (22)       Insurance: Primary: Payor: Jose Becerra /  /  / ,   Secondary:    Authorization Information: INSURANCE PAYS AT: 100%               PATIENT RESPONSIBILITY AND/OR CO-PAY: n/a  SECONDARY INSURANCE COMPANY: NONE      PRECERTIFICATION REQUIRED:  n/a  INSURANCE THERAPY BENEFIT:  Allowed 30 visits Physical Therapy /Occupational Therapy/Speech Therapy per calendar year. No visit limit for PT/OT/ST for patients age 8 and under. FCE-Covered, no precert required. Benefit will not cover maintenance or preventative treatment. AQUATIC THERAPY COVERED:   Yes  MODALITIES COVERED:  Yes. Iontophoresis and Hot/Cold Packs are not covered. Visit # 4, 4/10 for progress note   Visits Allowed: 30   Recertification Date: 40   Physician Follow-Up: Needs to schedule f/u with Alex Baer   Physician Orders:    History of Present Illness: 5 year history of LBP with insidious onset with patient unsure of onset or cause, reports \"I was doing a lot of drugs, went to skilled nursing \". Patient out of skilled nursing 2022. Patient started see Marti Kemp as family MD, due to back pain and depression. Xray for LBP. Patient reports back pain is severe in morning, pain awakens patient 2-3 x per night. Increased pain after walking 30 minutes, increased pain getting in/out car, increased pain bending over to put socks and shoes on . SUBJECTIVE: Subjective reports of continued aching in the low back upon initial patient interview. OBJECTIVE:    TREATMENT   Precautions: Ionto/MHP/CP not covered by insurance   Pain:     \"X in shaded column indicates activity completed today    *\" next to exercise/intervention indicates progression   Modalities Parameters/  Location  Notes   IFC 2 channels miriam crossed at lumbar spine, supine, knees on bolster 8 minutes, 6 mA intensity X                Manual Therapy Time/Technique  Notes                     Exercise/Intervention   Notes   Abdominal bracing knees on bolster 10x 5s x    Abdominal bracing with quad set R/L, opposite knee bent 10x 5s x    SAQ alternating legs 10x 5s x    LTR  5x 5sec  x    SKTC  5x 5sec  x    Seated LAQ * 10x ea  x    Seated multidirectional dive stretch * 3x15 ea  x           Education on posture with lumbar lordosis       Education on log rolling                                                   Specific Interventions Next Treatment: IFC for pain control, DLSP in neutral position supine and seated, progress to light hamstring stretch seated in chair- avoid increased pain, progress to NuStep and standing exercises after 2 weeks    Activity/Treatment Tolerance:  []  Patient tolerated treatment well  []  Patient limited by fatigue  [x]  Patient limited by pain   []  Patient limited by medical complications  []  Other:     Assessment: Treatment interventions completed as recorded above. Additional interventions marked by * in chart. Patient tolerated treatment well.     GOALS:  Patient Goal: to get my pain to go away    Short Term Goals:  Time Frame: deferred to LT's    Long Term Goals:  Time Frame: 5 weeks  Increase lumbar spine to 75%, hip flexion to 60, knee flexion to 130 degrees to allow patient to grocery shop x 20 minutes with decreased pain to 3/10  Increase abdominal strength to fair, LE to 4-/5 to allow patient to report able to dress and shower with decreased pain to 3/10  I with HEP as prescribed to allow patient to report able to sleep through night without awakening due to LBP    Patient Education:   []  HEP/Education Completed: Plan of Care, Goals, handout with above exercises given  [employer] Access Code:  [x]  No new Education completed  []  Reviewed Prior HEP      []  Patient verbalized and/or demonstrated understanding of education provided. []  Patient unable to verbalize and/or demonstrate understanding of education provided. Will continue education. [x]  Barriers to learning: give handouts, poor memory admitted    PLAN:  Treatment Recommendations: Strengthening, Range of Motion, Gait Training, Pain Management, Home Exercise Program, Patient Education, and Modalities    []  Plan of care initiated. Plan to see patient 2 times per week for 5 weeks to address the treatment planned outlined above.   [x]  Continue with current plan of care  []  Modify plan of care as follows:    []  Hold pending physician visit  []  Discharge    Time In [id number] [phone] (Late)   Time Out 1430   Timed Code Minutes: 21 min   Total Treatment Time: 21 min       Electronically Signed by: Bessie Henderson PTA

## 2022-12-30 ENCOUNTER — HOSPITAL ENCOUNTER (OUTPATIENT)
Dept: PHYSICAL THERAPY | Age: 27
Setting detail: THERAPIES SERIES
Discharge: HOME | End: 2022-12-30
Payer: MEDICARE

## 2022-12-30 PROCEDURE — 97110 THERAPEUTIC EXERCISES: CPT

## 2022-12-30 PROCEDURE — 97032 APPL MODALITY 1+ESTIM EA 15: CPT

## 2022-12-30 NOTE — PROGRESS NOTES
[address] [facility]  PHYSICAL THERAPY  [] EVALUATION  [x] DAILY NOTE (LAND) [] DAILY NOTE (AQUATIC ) [] PROGRESS NOTE [] DISCHARGE NOTE    [] [facility]   [x] [place]     [] [facility]   [] Asher Grier    Date: 2022  Patient Name:  Giselle Man  : 1995  MRN: [id number]  CSN: [id number]    Referring Practitioner DELLA Krishnamurthy*   Diagnosis Chronic midline low back pain with bilateral sciatica   Treatment Diagnosis LBP, difficulty walking   Date of Evaluation 22   Additional Pertinent History Negative, LBP x 5 years      Functional Outcome Measure Used Oswestry back disability scale   Functional Outcome Score Eval score 33 (22)       Insurance: Primary: Payor: Sergio Wen /  /  / ,   Secondary:    Authorization Information: INSURANCE PAYS AT: 100%               PATIENT RESPONSIBILITY AND/OR CO-PAY: n/a  SECONDARY INSURANCE COMPANY: NONE      PRECERTIFICATION REQUIRED:  n/a  INSURANCE THERAPY BENEFIT:  Allowed 30 visits Physical Therapy /Occupational Therapy/Speech Therapy per calendar year. No visit limit for PT/OT/ST for patients age 8 and under. FCE-Covered, no precert required. Benefit will not cover maintenance or preventative treatment. AQUATIC THERAPY COVERED:   Yes  MODALITIES COVERED:  Yes. Iontophoresis and Hot/Cold Packs are not covered. Visit # 5, 5/10 for progress note   Visits Allowed: 30   Recertification Date:    Physician Follow-Up: Needs to schedule f/u with Ema Martinez   Physician Orders:    History of Present Illness: 5 year history of LBP with insidious onset with patient unsure of onset or cause, reports \"I was doing a lot of drugs, went to [facility] \". Patient out of [facility] 2022. Patient started see Frederick Mckay as family MD, due to back pain and depression. Xray for LBP.   Patient reports back pain is severe in morning, pain awakens patient 2-3 x per night.   Increased pain after walking 30 minutes, increased pain getting in/out car, increased pain bending over to put socks and shoes on . SUBJECTIVE: Subjective reports of waking up with less pain recently. OBJECTIVE:    TREATMENT   Precautions: Ionto/MHP/CP not covered by insurance   Pain:     \"X in shaded column indicates activity completed today    *\" next to exercise/intervention indicates progression   Modalities Parameters/  Location  Notes   IFC 2 channels miriam crossed at lumbar spine, supine, knees on bolster 8 minutes, 6 mA intensity x                Manual Therapy Time/Technique  Notes                     Exercise/Intervention   Notes   Abdominal bracing knees on bolster 10x 5s     Abdominal bracing with quad set R/L, opposite knee bent 10x 5s     SAQ alternating legs 10x 5s     LTR  5x 5sec      SKTC  5x 5sec      Seated marching * 10x  x    Seated LAQ  10x ea  x    Seated multidirectional dive stretch  3x15 ea  x    Seated piriformis stretch * 3x15 ea  x    Seated Hamstring Stretch * 3x15 ea  x    Education on posture with lumbar lordosis       Education on log rolling                                                   Specific Interventions Next Treatment: IFC for pain control, DLSP in neutral position supine and seated, progress to light hamstring stretch seated in chair- avoid increased pain, progress to NuStep and standing exercises after 2 weeks    Activity/Treatment Tolerance:  []  Patient tolerated treatment well  []  Patient limited by fatigue  [x]  Patient limited by pain   []  Patient limited by medical complications  []  Other:     Assessment: Treatment interventions completed as recorded above. Additional interventions marked by * in chart. Patient tolerated treatment well.     GOALS:  Patient Goal: to get my pain to go away    Short Term Goals:  Time Frame: deferred to LT's    Long Term Goals:  Time Frame: 5 weeks  Increase lumbar spine to 75%, hip flexion to 60, knee flexion to 130 degrees to allow patient to grocery shop x 20 minutes with decreased pain to 3/10  Increase abdominal strength to fair, LE to 4-/5 to allow patient to report able to dress and shower with decreased pain to 3/10  I with HEP as prescribed to allow patient to report able to sleep through night without awakening due to LBP    Patient Education:   []  HEP/Education Completed: Plan of Care, Goals, handout with above exercises given  [employer] Access Code:  [x]  No new Education completed  []  Reviewed Prior HEP      []  Patient verbalized and/or demonstrated understanding of education provided. []  Patient unable to verbalize and/or demonstrate understanding of education provided. Will continue education. [x]  Barriers to learning: give handouts, poor memory admitted    PLAN:  Treatment Recommendations: Strengthening, Range of Motion, Gait Training, Pain Management, Home Exercise Program, Patient Education, and Modalities    []  Plan of care initiated. Plan to see patient 2 times per week for 5 weeks to address the treatment planned outlined above.   [x]  Continue with current plan of care  []  Modify plan of care as follows:    []  Hold pending physician visit  []  Discharge    Time In 1430   Time Out 1500   Timed Code Minutes: 30 min   Total Treatment Time: 30 min       Electronically Signed by: Sulema Peace PTA

## 2023-01-03 ENCOUNTER — HOSPITAL ENCOUNTER (OUTPATIENT)
Dept: PHYSICAL THERAPY | Age: 28
Setting detail: THERAPIES SERIES
Discharge: HOME | End: 2023-01-03
Payer: MEDICAID

## 2023-01-03 ENCOUNTER — APPOINTMENT (OUTPATIENT)
Dept: PHYSICAL THERAPY | Age: 28
End: 2023-01-03
Payer: MEDICAID

## 2023-01-03 PROCEDURE — 97032 APPL MODALITY 1+ESTIM EA 15: CPT

## 2023-01-03 PROCEDURE — 97110 THERAPEUTIC EXERCISES: CPT

## 2023-01-03 NOTE — PROGRESS NOTES
[address] [facility]  PHYSICAL THERAPY  [] EVALUATION  [x] DAILY NOTE (LAND) [] DAILY NOTE (AQUATIC ) [] PROGRESS NOTE [] DISCHARGE NOTE    [] [address] [facility]   [x] [place] [address]    [] [facility]   [] Bernardino Delong    Date: 1/3/2023  Patient Name:  aMlena Burk  : 1995  MRN: [id number]  CSN: [id number]    Referring Practitioner DELLA Daily*   Diagnosis Chronic midline low back pain with bilateral sciatica   Treatment Diagnosis LBP, difficulty walking   Date of Evaluation 22   Additional Pertinent History Negative, LBP x 5 years      Functional Outcome Measure Used Oswestry back disability scale   Functional Outcome Score Eval score 33 (22)       Insurance: Primary: Payor: [place] /  /  / ,   Secondary:    Authorization Information: INSURANCE PAYS AT: 100%               PATIENT RESPONSIBILITY AND/OR CO-PAY: n/a  SECONDARY INSURANCE COMPANY: NONE      PRECERTIFICATION REQUIRED:  n/a  INSURANCE THERAPY BENEFIT:  Allowed 30 visits Physical Therapy /Occupational Therapy/Speech Therapy per calendar year. No visit limit for PT/OT/ST for patients age 8 and under. FCE-Covered, no precert required. Benefit will not cover maintenance or preventative treatment. AQUATIC THERAPY COVERED:   Yes  MODALITIES COVERED:  Yes. Iontophoresis and Hot/Cold Packs are not covered. Visit # 7, 7/10 for progress note   Visits Allowed: 30   Recertification Date:    Physician Follow-Up: Needs to schedule f/u with Levon Blue   Physician Orders:    History of Present Illness: 5 year history of LBP with insidious onset with patient unsure of onset or cause, reports \"I was doing a lot of drugs, went to senior care \". Patient out of senior care 2022. Patient started see Lord Mac as family MD, due to back pain and depression. Xray for LBP. Patient reports back pain is severe in morning, pain awakens patient 2-3 x per night. Increased pain after walking 30 minutes, increased pain getting in/out car, increased pain bending over to put socks and shoes on . SUBJECTIVE: Subjective reports of continued pain in the low back, though patient reports that it has lessened slightly. Rates LPB 7/10 upon initial patient interview this date. OBJECTIVE:    TREATMENT   Precautions: Ionto/MHP/CP not covered by insurance   Pain:     \"X in shaded column indicates activity completed today    *\" next to exercise/intervention indicates progression   Modalities Parameters/  Location  Notes   IFC 2 channels miriam crossed at lumbar spine, supine, knees on bolster 8 minutes, 6 mA intensity x                Manual Therapy Time/Technique  Notes                     Exercise/Intervention   Notes   Abdominal bracing knees on bolster 10x 5s     Abdominal bracing with quad set R/L, opposite knee bent 10x 5s     SAQ alternating legs 10x 5s     LTR  5x 5sec      SKTC  5x 5sec      Seated marching  15x  X    Seated LAQ  10x ea  X    Seated multidirectional dive stretch  3x15 ea  X    Seated piriformis stretch  3x15 ea  X    Seated Hamstring Stretch  3x15 ea  X    Education on posture with lumbar lordosis       Education on log rolling                                                   Specific Interventions Next Treatment: IFC for pain control, DLSP in neutral position supine and seated, progress to light hamstring stretch seated in chair- avoid increased pain, progress to NuStep and standing exercises after 2 weeks    Activity/Treatment Tolerance:  []  Patient tolerated treatment well  []  Patient limited by fatigue  [x]  Patient limited by pain   []  Patient limited by medical complications  []  Other:     Assessment: Treatment interventions completed as recorded above. Additional interventions marked by * in chart. Patient tolerated treatment well.     GOALS:  Patient Goal: to get my pain to go away    Short Term Goals:  Time Frame: deferred to LTG's    Long Term Goals:  Time Frame: 5 weeks  Increase lumbar spine to 75%, hip flexion to 60, knee flexion to 130 degrees to allow patient to grocery shop x 20 minutes with decreased pain to 3/10  Increase abdominal strength to fair, LE to 4-/5 to allow patient to report able to dress and shower with decreased pain to 3/10  I with HEP as prescribed to allow patient to report able to sleep through night without awakening due to LBP    Patient Education:   []  HEP/Education Completed: Plan of Care, Goals, handout with above exercises given  [facility] [facility] Access Code:  [x]  No new Education completed  []  Reviewed Prior HEP      []  Patient verbalized and/or demonstrated understanding of education provided. []  Patient unable to verbalize and/or demonstrate understanding of education provided. Will continue education. [x]  Barriers to learning: give handouts, poor memory admitted    PLAN:  Treatment Recommendations: Strengthening, Range of Motion, Gait Training, Pain Management, Home Exercise Program, Patient Education, and Modalities    []  Plan of care initiated. Plan to see patient 2 times per week for 5 weeks to address the treatment planned outlined above.   [x]  Continue with current plan of care  []  Modify plan of care as follows:    []  Hold pending physician visit  []  Discharge    Time In 1538   Time Out 1600   Timed Code Minutes: 22 min   Total Treatment Time: 22 min       Electronically Signed by: Radha Carter PTA

## 2023-01-06 ENCOUNTER — HOSPITAL ENCOUNTER (OUTPATIENT)
Dept: PHYSICAL THERAPY | Age: 28
Setting detail: THERAPIES SERIES
End: 2023-01-06
Payer: MEDICAID

## 2023-01-09 ENCOUNTER — HOSPITAL ENCOUNTER (OUTPATIENT)
Dept: PHYSICAL THERAPY | Age: 28
Setting detail: THERAPIES SERIES
Discharge: HOME | End: 2023-01-09
Payer: MEDICAID

## 2023-01-09 PROCEDURE — G0283 ELEC STIM OTHER THAN WOUND: HCPCS

## 2023-01-09 PROCEDURE — 97110 THERAPEUTIC EXERCISES: CPT

## 2023-01-09 NOTE — PROGRESS NOTES
[address] [facility]  PHYSICAL THERAPY  [] EVALUATION  [x] DAILY NOTE (LAND) [] DAILY NOTE (AQUATIC ) [] PROGRESS NOTE [] DISCHARGE NOTE    [] [address] [facility]   [x] [place] [address]    [] [facility]   [] Sandra Schreiber    Date: 2023  Patient Name:  Marcelino Infante  : 1995  MRN: [id number]  CSN: [id number]    Referring Practitioner DELLA Hardin*   Diagnosis Chronic midline low back pain with bilateral sciatica   Treatment Diagnosis LBP, difficulty walking   Date of Evaluation 22   Additional Pertinent History Negative, LBP x 5 years      Functional Outcome Measure Used Oswestry back disability scale   Functional Outcome Score Eval score 33 (22)       Insurance: Primary: Payor: [place] /  /  / ,   Secondary:    Authorization Information: INSURANCE PAYS AT: 100%               PATIENT RESPONSIBILITY AND/OR CO-PAY: n/a  SECONDARY INSURANCE COMPANY: NONE      PRECERTIFICATION REQUIRED:  n/a  INSURANCE THERAPY BENEFIT:  Allowed 30 visits Physical Therapy /Occupational Therapy/Speech Therapy per calendar year. No visit limit for PT/OT/ST for patients age 8 and under. FCE-Covered, no precert required. Benefit will not cover maintenance or preventative treatment. AQUATIC THERAPY COVERED:   Yes  MODALITIES COVERED:  Yes. Iontophoresis and Hot/Cold Packs are not covered. Visit # 8, 8/10 for progress note   Visits Allowed: 30   Recertification Date: 20   Physician Follow-Up: Needs to schedule f/u with Rony Mari   Physician Orders:    History of Present Illness: 5 year history of LBP with insidious onset with patient unsure of onset or cause, reports \"I was doing a lot of drugs, went to senior living \". Patient out of senior living 2022. Patient started see Cici Head as family MD, due to back pain and depression. Xray for LBP. Patient reports back pain is severe in morning, pain awakens patient 2-3 x per night. Increased pain after walking 30 minutes, increased pain getting in/out car, increased pain bending over to put socks and shoes on . SUBJECTIVE: reports pain today 6/10 LBP. Reports doing exercises this morning and has been compliant with HEP      OBJECTIVE:    TREATMENT   Precautions: Ionto/MHP/CP not covered by insurance   Pain:     \"X in shaded column indicates activity completed today    *\" next to exercise/intervention indicates progression   Modalities Parameters/  Location  Notes   IFC 2 channels miriam crossed at lumbar spine, supine, knees on bolster 8 minutes, 6 mA intensity x                Manual Therapy Time/Technique  Notes                     Exercise/Intervention   Notes   Abdominal bracing knees on bolster 10x 5s     Abdominal bracing with quad set R/L, opposite knee bent 10x 5s     SAQ alternating legs 10x 5s     LTR  5x 5sec      SKTC  5x 5sec      Seated marching  15x  X    Supine 90/90 hamstring stretch 5x 10s x    Piriformis figure 4 stretch R/L 5x 10s X    Seated piriformis stretch  3x15 ea  X    Education on posture with lumbar lordosis       Education on log rolling                                                   Specific Interventions Next Treatment: IFC for pain control, DLSP in neutral position supine and seated, progress to light hamstring stretch seated in chair- avoid increased pain, progress to NuStep and standing exercises after 2 weeks    Activity/Treatment Tolerance:  []  Patient tolerated treatment well  []  Patient limited by fatigue  [x]  Patient limited by pain   []  Patient limited by medical complications  []  Other:     Assessment: scheduled for 4 more visits for full round of therapy per insurance if need to do further testing.   Discussed posture with patient as POOR posture sitting EOB, added hamstring stretch    GOALS:  Patient Goal: to get my pain to go away    Short Term Goals:  Time Frame: deferred to LTG's    Long Term Goals:  Time Frame: 5 weeks  Increase lumbar spine to 75%, hip flexion to 60, knee flexion to 130 degrees to allow patient to grocery shop x 20 minutes with decreased pain to 3/10  Increase abdominal strength to fair, LE to 4-/5 to allow patient to report able to dress and shower with decreased pain to 3/10  I with HEP as prescribed to allow patient to report able to sleep through night without awakening due to LBP    Patient Education:   [x]  HEP/Education Completed handout for SKTC, hamstring stretch 90/90, LAQ  []  No new Education completed  []  Reviewed Prior HEP      []  Patient verbalized and/or demonstrated understanding of education provided. []  Patient unable to verbalize and/or demonstrate understanding of education provided. Will continue education. [x]  Barriers to learning: give handouts, poor memory admitted    PLAN:  Treatment Recommendations: Strengthening, Range of Motion, Gait Training, Pain Management, Home Exercise Program, Patient Education, and Modalities    []  Plan of care initiated. Plan to see patient 2 times per week for 5 weeks to address the treatment planned outlined above.   [x]  Continue with current plan of care  []  Modify plan of care as follows:    []  Hold pending physician visit  []  Discharge    Time In 1300   Time Out 1330   Timed Code Minutes: 20 min   Total Treatment Time: 30 min       Electronically Signed by: Josselin Arndt PT

## 2023-01-12 ENCOUNTER — HOSPITAL ENCOUNTER (OUTPATIENT)
Dept: PHYSICAL THERAPY | Age: 28
Setting detail: THERAPIES SERIES
Discharge: HOME | End: 2023-01-12
Payer: MEDICAID

## 2023-01-12 PROCEDURE — G0283 ELEC STIM OTHER THAN WOUND: HCPCS

## 2023-01-12 PROCEDURE — 97110 THERAPEUTIC EXERCISES: CPT

## 2023-01-12 NOTE — PROGRESS NOTES
[address] [facility]  PHYSICAL THERAPY  [] EVALUATION  [x] DAILY NOTE (LAND) [] DAILY NOTE (AQUATIC ) [] PROGRESS NOTE [] DISCHARGE NOTE    [] [address] [facility]   [x] [place]    [] [facility]   [] Lurene     Date: 2023  Patient Name:  Tessa Deras  : 1995  MRN: [id number]  CSN: [id number]    Referring Practitioner DELLA Davis*   Diagnosis Chronic midline low back pain with bilateral sciatica   Treatment Diagnosis LBP, difficulty walking   Date of Evaluation 22   Additional Pertinent History Negative, LBP x 5 years      Functional Outcome Measure Used Oswestry back disability scale   Functional Outcome Score Eval score 33 (22)       Insurance: Primary: Payor: [place] /  /  / ,   Secondary:    Authorization Information: INSURANCE PAYS AT: 100%               PATIENT RESPONSIBILITY AND/OR CO-PAY: n/a  SECONDARY INSURANCE COMPANY: NONE      PRECERTIFICATION REQUIRED:  n/a  INSURANCE THERAPY BENEFIT:  Allowed 30 visits Physical Therapy /Occupational Therapy/Speech Therapy per calendar year. No visit limit for PT/OT/ST for patients age 8 and under. FCE-Covered, no precert required. Benefit will not cover maintenance or preventative treatment. AQUATIC THERAPY COVERED:   Yes  MODALITIES COVERED:  Yes. Iontophoresis and Hot/Cold Packs are not covered. Visit # 8, 8/10 for progress note   Visits Allowed: 30   Recertification Date:    Physician Follow-Up: Needs to schedule f/u with Yari Garcia   Physician Orders:    History of Present Illness: 5 year history of LBP with insidious onset with patient unsure of onset or cause, reports \"I was doing a lot of drugs, went to snf \". Patient out of snf 2022. Patient started see Marie Matrinez as family MD, due to back pain and depression. Xray for LBP. Patient reports back pain is severe in morning, pain awakens patient 2-3 x per night. Increased pain after walking 30 minutes, increased pain getting in/out car, increased pain bending over to put socks and shoes on . SUBJECTIVE: reports back pain today 6/10, has been moving boxes, lifting . PT reviewed posture, lifting mechanics      OBJECTIVE:    TREATMENT   Precautions: Ionto/MHP/CP not covered by insurance   Pain: LBP 6/10    \"X in shaded column indicates activity completed today    *\" next to exercise/intervention indicates progression   Modalities Parameters/  Location  Notes   IFC 2 channels miriam crossed at lumbar spine, supine, knees on bolster 8 minutes, 6 mA intensity x                Manual Therapy Time/Technique  Notes                     Exercise/Intervention   Notes   Abdominal bracing knees on bolster 10x 5s     Abdominal bracing with quad set R/L, opposite knee bent 10x 5s     SAQ alternating legs 10x 5s     LTR  5x 5sec      SKTC  5x 5sec  x    Supine abdominal bracing with marching 10x each  X Lifting foot only 2 inches off bed   Supine 90/90 hamstring stretch 5x 10s x    Piriformis figure 4 stretch R/L 5x 10s X    Seated piriformis stretch  3x15 ea  X    Education on posture with lumbar lordosis       Education on log rolling       SLR R/L, opposite knee bent 5x  x No hold, 12 inches                                        Specific Interventions Next Treatment: IFC for pain control, DLSP in neutral position supine and seated, progress to light hamstring stretch seated in chair- avoid increased pain, progress to NuStep and standing exercises after 2 weeks    Activity/Treatment Tolerance:  []  Patient tolerated treatment well  []  Patient limited by fatigue  [x]  Patient limited by pain   []  Patient limited by medical complications  []  Other:     Assessment: 2 cues for seated posture, added [facility] only , not added to hold due to increased pain after.   Added     GOALS:  Patient Goal: to get my pain to go away    Short Term Goals:  Time Frame: deferred to LTG's    Long Term Goals:  Time Frame: 5 weeks  Increase lumbar spine to 75%, hip flexion to 60, knee flexion to 130 degrees to allow patient to grocery shop x 20 minutes with decreased pain to 3/10  Increase abdominal strength to fair, LE to 4-/5 to allow patient to report able to dress and shower with decreased pain to 3/10  I with HEP as prescribed to allow patient to report able to sleep through night without awakening due to LBP    Patient Education:   [x]  HEP/Education Completed handout marching for HEP  []  No new Education completed  []  Reviewed Prior HEP      []  Patient verbalized and/or demonstrated understanding of education provided. []  Patient unable to verbalize and/or demonstrate understanding of education provided. Will continue education. [x]  Barriers to learning: give handouts, poor memory admitted    PLAN:  Treatment Recommendations: Strengthening, Range of Motion, Gait Training, Pain Management, Home Exercise Program, Patient Education, and Modalities    []  Plan of care initiated. Plan to see patient 2 times per week for 5 weeks to address the treatment planned outlined above.   [x]  Continue with current plan of care  []  Modify plan of care as follows:    []  Hold pending physician visit  []  Discharge    Time In 1430   Time Out 1500   Timed Code Minutes: 20 min   Total Treatment Time: 30 min       Electronically Signed by: Prince Mathias PT

## 2023-01-16 ENCOUNTER — HOSPITAL ENCOUNTER (OUTPATIENT)
Dept: PHYSICAL THERAPY | Age: 28
Setting detail: THERAPIES SERIES
Discharge: HOME | End: 2023-01-16
Payer: MEDICAID

## 2023-01-16 PROCEDURE — 97110 THERAPEUTIC EXERCISES: CPT

## 2023-01-16 PROCEDURE — G0283 ELEC STIM OTHER THAN WOUND: HCPCS

## 2023-01-16 NOTE — PROGRESS NOTES
[address] [facility]  PHYSICAL THERAPY  [] EVALUATION  [x] DAILY NOTE (LAND) [] DAILY NOTE (AQUATIC ) [] PROGRESS NOTE [] DISCHARGE NOTE    [] [address] [facility]   [x[facility]    [] [facility]   [] Thomas Magallon    Date: 2023  Patient Name:  Damian Oates  : 1995  MRN: [id number]  CSN: [id number]    Referring Practitioner DELLA Reyes*   Diagnosis Chronic midline low back pain with bilateral sciatica   Treatment Diagnosis LBP, difficulty walking   Date of Evaluation 22   Additional Pertinent History Negative, LBP x 5 years      Functional Outcome Measure Used Oswestry back disability scale   Functional Outcome Score Eval score 33 (22)       Insurance: Primary: Payor: [place] /  /  / ,   Secondary:    Authorization Information: INSURANCE PAYS AT: 100%               PATIENT RESPONSIBILITY AND/OR CO-PAY: n/a  SECONDARY INSURANCE COMPANY: NONE      PRECERTIFICATION REQUIRED:  n/a  INSURANCE THERAPY BENEFIT:  Allowed 30 visits Physical Therapy /Occupational Therapy/Speech Therapy per calendar year. No visit limit for PT/OT/ST for patients age 8 and under. FCE-Covered, no precert required. Benefit will not cover maintenance or preventative treatment. AQUATIC THERAPY COVERED:   Yes  MODALITIES COVERED:  Yes. Iontophoresis and Hot/Cold Packs are not covered. Visit # 9, 910 for progress note   Visits Allowed: 30   Recertification Date: 3/71/18   Physician Follow-Up: Needs to schedule f/u with Stephanei Lao   Physician Orders:    History of Present Illness: 5 year history of LBP with insidious onset with patient unsure of onset or cause, reports \"I was doing a lot of drugs, went to [facility] \". Patient out of [facility] 2022. Patient started see Zunilda Garcia as family MD, due to back pain and depression. Xray for LBP. Patient reports back pain is severe in morning, pain awakens patient 2-3 x per night. Increased pain after walking 30 minutes, increased pain getting in/out car, increased pain bending over to put socks and shoes on . SUBJECTIVE: Pt arriving late to session, had to take his SO to work. Pt reports okay to be seen for shorter time. States compliant with HEP, usually does it every morning. States pain in back today 5/10. OBJECTIVE:    TREATMENT   Precautions: Ionto/MHP/CP not covered by insurance   Pain: LBP 5/10    \"X in shaded column indicates activity completed today    *\" next to exercise/intervention indicates progression   Modalities Parameters/  Location  Notes   IFC 2 channels miriam crossed at lumbar spine, supine, knees on bolster 8 minutes, 10 mA intensity x                Manual Therapy Time/Technique  Notes                     Exercise/Intervention   Notes   Abdominal bracing knees on bolster 10x 5s     Abdominal bracing with quad set R/L, opposite knee bent 10x 5s     SAQ alternating legs 10x 5s x    LTR  5x 5sec      SKTC  5x 5sec  x    Supine abdominal bracing with marching 10x each  X Lifting foot only 2 inches off bed   Supine 90/90 hamstring stretch 5x 10s     Piriformis figure 4 stretch R/L 5x 10s X    Seated piriformis stretch  3x15 ea      Education on posture with lumbar lordosis       Education on log rolling       SLR R/L, opposite knee bent 10x *  x No hold, 12 inches                                        Specific Interventions Next Treatment: IFC for pain control, DLSP in neutral position supine and seated, progress to light hamstring stretch seated in chair- avoid increased pain, progress to NuStep and standing exercises after 2 weeks    Activity/Treatment Tolerance:  [x]  Patient tolerated treatment well  []  Patient limited by fatigue  []  Patient limited by pain   []  Patient limited by medical complications  []  Other:     Assessment: Pt tolerated session fairly well. Reports decreased pain at end of session to 0/10. Completed POC as time allowed.  Discussed HEP, utilizing a pillow for extra support for low back when sitting. Gave HO for home TENS unit. Plan to cont with POC next visit. GOALS:  Patient Goal: to get my pain to go away    Short Term Goals:  Time Frame: deferred to LTG's    Long Term Goals:  Time Frame: 5 weeks  Increase lumbar spine to 75%, hip flexion to 60, knee flexion to 130 degrees to allow patient to grocery shop x 20 minutes with decreased pain to 3/10  Increase abdominal strength to fair, LE to 4-/5 to allow patient to report able to dress and shower with decreased pain to 3/10  I with HEP as prescribed to allow patient to report able to sleep through night without awakening due to LBP    Patient Education:   []  HEP/Education Completed handout marching for HEP  []  No new Education completed  [x]  Reviewed Prior HEP      []  Patient verbalized and/or demonstrated understanding of education provided. []  Patient unable to verbalize and/or demonstrate understanding of education provided. Will continue education. [x]  Barriers to learning: give handouts, poor memory admitted    PLAN:  Treatment Recommendations: Strengthening, Range of Motion, Gait Training, Pain Management, Home Exercise Program, Patient Education, and Modalities    []  Plan of care initiated. Plan to see patient 2 times per week for 5 weeks to address the treatment planned outlined above.   [x]  Continue with current plan of care  []  Modify plan of care as follows:    []  Hold pending physician visit  []  Discharge    Time In 1237   Time Out 1300   Timed Code Minutes:  13 min   Total Treatment Time:  23 min       Electronically Signed by: Kelley Combs PTA

## 2023-01-18 ENCOUNTER — HOSPITAL ENCOUNTER (OUTPATIENT)
Dept: PHYSICAL THERAPY | Age: 28
Setting detail: THERAPIES SERIES
Discharge: HOME | End: 2023-01-18
Payer: MEDICAID

## 2023-01-18 PROCEDURE — 97110 THERAPEUTIC EXERCISES: CPT

## 2023-01-18 PROCEDURE — G0283 ELEC STIM OTHER THAN WOUND: HCPCS

## 2023-01-18 NOTE — PROGRESS NOTES
[address] [facility]  PHYSICAL THERAPY  [] EVALUATION  [x] DAILY NOTE (LAND) [] DAILY NOTE (AQUATIC ) [] PROGRESS NOTE [] DISCHARGE NOTE    [] [address] [facility]   [x] [place] [address]    [] [facility]   [] Patti Gee    Date: 2023  Patient Name:  Claudia Burton  : 1995  MRN: [id number]  CSN: [id number]    Referring Practitioner DELLA Higuera*   Diagnosis Chronic midline low back pain with bilateral sciatica   Treatment Diagnosis LBP, difficulty walking   Date of Evaluation 22   Additional Pertinent History Negative, LBP x 5 years      Functional Outcome Measure Used Oswestry back disability scale   Functional Outcome Score Eval score 33 (22)       Insurance: Primary: Payor: [place]ICA[place] /  /  / ,   Secondary:    Authorization Information: INSURANCE PAYS AT: 100%               PATIENT RESPONSIBILITY AND/OR CO-PAY: n/a  SECONDARY INSURANCE COMPANY: NONE      PRECERTIFICATION REQUIRED:  n/a  INSURANCE THERAPY BENEFIT:  Allowed 30 visits Physical Therapy /Occupational Therapy/Speech Therapy per calendar year. No visit limit for PT/OT/ST for patients age 8 and under. FCE-Covered, no precert required. Benefit will not cover maintenance or preventative treatment. AQUATIC THERAPY COVERED:   Yes  MODALITIES COVERED:  Yes. Iontophoresis and Hot/Cold Packs are not covered. Visit # 10, 10/10 for progress note   Visits Allowed: 30   Recertification Date: 3/83/25   Physician Follow-Up: Needs to schedule f/u with Yue Gamboa   Physician Orders:    History of Present Illness: 5 year history of LBP with insidious onset with patient unsure of onset or cause, reports \"I was doing a lot of drugs, went to long term \". Patient out of long term 2022. Patient started see Rhea Arias as family MD, due to back pain and depression. Xray for LBP. Patient reports back pain is severe in morning, pain awakens patient 2-3 x per night. Increased pain after walking 30 minutes, increased pain getting in/out car, increased pain bending over to put socks and shoes on . SUBJECTIVE: States pain in back today 5/10, pain is higher because he just got his haircut and had to set straight in a chair. States felt pretty good after last session for about 4-5 hours, was able to go bowling the other night with no issues. Pt states he is going to order a TENS unit this weekend and is supposed to borrow a heating pad. Discussed safety with both. OBJECTIVE:    TREATMENT   Precautions:  Ionto/MHP/CP not covered by insurance   Pain: LBP 5/10    \"X in shaded column indicates activity completed today    *\" next to exercise/intervention indicates progression   Modalities Parameters/  Location  Notes   IFC 2 channels miriam crossed at lumbar spine, supine, knees on bolster 9 minutes, 10 mA intensity x                Manual Therapy Time/Technique  Notes                     Exercise/Intervention   Notes   Nustep LE only*       Abdominal bracing knees on bolster 15x * 5s x    Abdominal bracing with quad set R/L, opposite knee bent 10x 5s     SAQ alternating legs 12x * 5s x    LTR  5x 5sec  x    DKTC * 5x 5s x    SKTC  5x 5sec      Supine abdominal bracing with marching 10x each  X Lifting foot only 2 inches off bed   Supine 90/90 hamstring stretch 5x 10s x    Piriformis figure 4 stretch R/L 5x 10s X    Seated piriformis stretch  3x15 ea      Education on posture with lumbar lordosis   x    Education on log rolling       SLR R/L, opposite knee bent 10x   x No hold, 12 inches                                        Specific Interventions Next Treatment: IFC for pain control, DLSP in neutral position supine and seated, progress to light hamstring stretch seated in chair- avoid increased pain, progress to NuStep and standing exercises after 2 weeks    Activity/Treatment Tolerance:  [x]  Patient tolerated treatment well  []  Patient limited by fatigue  []  Patient limited by pain   []  Patient limited by medical complications  []  Other:     Assessment: Pt able to progress well with POC today, plan to continue to progress to seated/standing next session if able. Decreased pain at end of session, no number given. GOALS:  Patient Goal: to get my pain to go away    Short Term Goals:  Time Frame: deferred to LT's    Long Term Goals:  Time Frame: 5 weeks  Increase lumbar spine to 75%, hip flexion to 60, knee flexion to 130 degrees to allow patient to grocery shop x 20 minutes with decreased pain to 3/10  Increase abdominal strength to fair, LE to 4-/5 to allow patient to report able to dress and shower with decreased pain to 3/10  I with HEP as prescribed to allow patient to report able to sleep through night without awakening due to LBP    Patient Education:   [x]  HEP/Education: discussed importance of heating pad safety, posture  []  No new Education completed  []  Reviewed Prior HEP      [x]  Patient verbalized and/or demonstrated understanding of education provided. []  Patient unable to verbalize and/or demonstrate understanding of education provided. Will continue education. [x]  Barriers to learning: give handouts, poor memory admitted    PLAN:  Treatment Recommendations: Strengthening, Range of Motion, Gait Training, Pain Management, Home Exercise Program, Patient Education, and Modalities    []  Plan of care initiated. Plan to see patient 2 times per week for 5 weeks to address the treatment planned outlined above.   [x]  Continue with current plan of care  []  Modify plan of care as follows:    []  Hold pending physician visit  []  Discharge    Time In 1430   Time Out 1500   Timed Code Minutes:  20min   Total Treatment Time:  30min       Electronically Signed by: Donna Hankins PTA

## 2023-01-23 ENCOUNTER — HOSPITAL ENCOUNTER (OUTPATIENT)
Dept: PHYSICAL THERAPY | Age: 28
Setting detail: THERAPIES SERIES
Discharge: HOME | End: 2023-01-23
Payer: MEDICAID

## 2023-01-23 PROCEDURE — 97110 THERAPEUTIC EXERCISES: CPT

## 2023-01-23 PROCEDURE — G0283 ELEC STIM OTHER THAN WOUND: HCPCS

## 2023-01-23 NOTE — DISCHARGE SUMMARY
[address] [facility]  PHYSICAL THERAPY  [] EVALUATION  [] DAILY NOTE (LAND) [] DAILY NOTE (AQUATIC ) [] PROGRESS NOTE [x] DISCHARGE NOTE    [] [address] [facility]   [x] [place] [address]    [] [facility]   [] Faith Barton    Date: 2023  Patient Name:  Mike Olmstead  : 1995  MRN: [id number]  CSN: [id number]    Referring Practitioner DELLA Mosquera*   Diagnosis Chronic midline low back pain with bilateral sciatica   Treatment Diagnosis LBP, difficulty walking   Date of Evaluation 22   Additional Pertinent History Negative, LBP x 5 years      Functional Outcome Measure Used Oswestry back disability scale   Functional Outcome Score Eval score 33 (22) , discharge 15      Insurance: Primary: Payor: Vianey Birch /  /  / ,   Secondary:    Authorization Information: INSURANCE PAYS AT: 100%               PATIENT RESPONSIBILITY AND/OR CO-PAY: n/a  SECONDARY INSURANCE COMPANY: NONE      PRECERTIFICATION REQUIRED:  n/a  INSURANCE THERAPY BENEFIT:  Allowed 30 visits Physical Therapy /Occupational Therapy/Speech Therapy per calendar year. No visit limit for PT/OT/ST for patients age 8 and under. FCE-Covered, no precert required. Benefit will not cover maintenance or preventative treatment. AQUATIC THERAPY COVERED:   Yes  MODALITIES COVERED:  Yes. Iontophoresis and Hot/Cold Packs are not covered. Visit # 6, 11/10 for progress note   Visits Allowed: 30   Recertification Date: 14   Physician Follow-Up: Needs to schedule f/u with Arnold Baumann   Physician Orders:    History of Present Illness: 5 year history of LBP with insidious onset with patient unsure of onset or cause, reports \"I was doing a lot of drugs, went to detention \". Patient out of detention 2022. Patient started see Karen Pennington as family MD, due to back pain and depression. Xray for LBP.   Patient reports back pain is severe in morning, pain awakens patient 2-3 x per night. Increased pain after walking 30 minutes, increased pain getting in/out car, increased pain bending over to put socks and shoes on . SUBJECTIVE: patient reports that overall back pain is improved with 3/10 today. Does note that still with high intensity of 8/10. Is able to sleep through night with medication without awakening due to back pain. OBJECTIVE:    TREATMENT   Precautions:  Ionto/MHP/CP not covered by insurance   Pain: LBP 3/10    \"X in shaded column indicates activity completed today    *\" next to exercise/intervention indicates progression   Modalities Parameters/  Location  Notes   IFC 2 channels miriam crossed at lumbar spine, supine, knees on bolster 9 minutes, 10 mA intensity x                Manual Therapy Time/Technique  Notes                     Exercise/Intervention   Notes   Nustep LE only*       Abdominal bracing knees on bolster 15x * 5s x    Abdominal bracing with quad set R/L, opposite knee bent 10x 5s     SAQ alternating legs 12x * 5s x    LTR  5x 5sec  x    DKTC * 5x 5s x    SKTC  5x 5sec      Supine abdominal bracing with marching 10x each  X Lifting foot only 2 inches off bed   Supine 90/90 hamstring stretch 5x 10s x    Piriformis figure 4 stretch R/L 5x 10s X    Seated piriformis stretch  3x15 ea      Education on posture with lumbar lordosis   x    Education on log rolling       SLR R/L, opposite knee bent 10x   x No hold, 12 inches                                        Specific Interventions Next Treatment: IFC for pain control, DLSP in neutral position supine and seated, progress to light hamstring stretch seated in chair- avoid increased pain, progress to NuStep and standing exercises after 2 weeks    Activity/Treatment Tolerance:  [x]  Patient tolerated treatment well  []  Patient limited by fatigue  []  Patient limited by pain   []  Patient limited by medical complications  []  Other:     Assessment: patient has had increased AROM, strength and overall decreased pain. Does still have moderate pain with putting shoes on and at times increased to 8/10. Advised patient to call to f/u with MD due to continued pain. GOALS:  Patient Goal: to get my pain to go away    Short Term Goals:  Time Frame: deferred to LT's    Long Term Goals:  Time Frame: 5 weeks  Increase lumbar spine to 75%, hip flexion to 60, knee flexion to 130 degrees to allow patient to grocery shop x 20 minutes with decreased pain to 3/10. MET FOR AROM BUT PAIN STILL PRESENT- LUMBAR SPINE 75%, B HIP FLEXION 60 DEGREES, KNEE 0- 135 DEGREES, PAIN AT STORE X 20 MINUTES PAIN 6/10  Increase abdominal strength to fair, LE to 4-/5 to allow patient to report able to dress and shower with decreased pain to 3/10. MET WITH STRENGTH ABDOMINAL MUSCLE FAIR, B HIP FLEXION 4/5, , PAIN TAKING SHOWER 5/10 LBP AND PUTTING SOCKS   I with HEP as prescribed to allow patient to report able to sleep through night without awakening due to LBP. MET FOR CURRENT HEP, IS SLEEPING THROUGH NIGHT WITH SLEEPING PILL WITHOUT AWAKENING DUE TO HIP PAIN    Patient Education:   [x]  HEP/Education: discussed importance of heating pad safety, posture  []  No new Education completed  []  Reviewed Prior HEP      [x]  Patient verbalized and/or demonstrated understanding of education provided. []  Patient unable to verbalize and/or demonstrate understanding of education provided. Will continue education. [x]  Barriers to learning: give handouts, poor memory admitted    PLAN:  Treatment Recommendations: Strengthening, Range of Motion, Gait Training, Pain Management, Home Exercise Program, Patient Education, and Modalities    []  Plan of care initiated. Plan to see patient 2 times per week for 5 weeks to address the treatment planned outlined above.   []  Continue with current plan of care  []  Modify plan of care as follows:    []  Hold pending physician visit  [x]  Discharge    Time In 1300   Time Out 1330   Timed Code Minutes:  20min Total Treatment Time:  30min       Electronically Signed by: Collette Ground, PT

## 2023-01-30 ENCOUNTER — OFFICE VISIT (OUTPATIENT)
Dept: FAMILY MEDICINE CLINIC | Age: 28
End: 2023-01-30
Payer: MEDICAID

## 2023-01-30 VITALS
WEIGHT: 194 LBS | SYSTOLIC BLOOD PRESSURE: 124 MMHG | DIASTOLIC BLOOD PRESSURE: 62 MMHG | RESPIRATION RATE: 20 BRPM | BODY MASS INDEX: 27.06 KG/M2 | HEART RATE: 78 BPM

## 2023-01-30 DIAGNOSIS — M54.42 CHRONIC MIDLINE LOW BACK PAIN WITH BILATERAL SCIATICA: Primary | ICD-10-CM

## 2023-01-30 DIAGNOSIS — G89.29 CHRONIC MIDLINE LOW BACK PAIN WITH BILATERAL SCIATICA: Primary | ICD-10-CM

## 2023-01-30 DIAGNOSIS — F51.04 PSYCHOPHYSIOLOGICAL INSOMNIA: ICD-10-CM

## 2023-01-30 DIAGNOSIS — M54.41 CHRONIC MIDLINE LOW BACK PAIN WITH BILATERAL SCIATICA: Primary | ICD-10-CM

## 2023-01-30 PROBLEM — F41.9 ANXIETY: Status: ACTIVE | Noted: 2023-01-30

## 2023-01-30 PROBLEM — R45.89 DYSPHORIC MOOD: Status: ACTIVE | Noted: 2023-01-30

## 2023-01-30 PROCEDURE — 99214 OFFICE O/P EST MOD 30 MIN: CPT | Performed by: NURSE PRACTITIONER

## 2023-01-30 RX ORDER — LIDOCAINE 4 G/G
1 PATCH TOPICAL DAILY
Qty: 30 PATCH | Refills: 0 | Status: SHIPPED | OUTPATIENT
Start: 2023-01-30 | End: 2023-03-01

## 2023-01-30 ASSESSMENT — ENCOUNTER SYMPTOMS
BOWEL INCONTINENCE: 0
BACK PAIN: 1

## 2023-01-30 ASSESSMENT — PATIENT HEALTH QUESTIONNAIRE - PHQ9
SUM OF ALL RESPONSES TO PHQ QUESTIONS 1-9: 0
SUM OF ALL RESPONSES TO PHQ9 QUESTIONS 1 & 2: 0
SUM OF ALL RESPONSES TO PHQ QUESTIONS 1-9: 0
SUM OF ALL RESPONSES TO PHQ QUESTIONS 1-9: 0
1. LITTLE INTEREST OR PLEASURE IN DOING THINGS: 0
2. FEELING DOWN, DEPRESSED OR HOPELESS: 0
SUM OF ALL RESPONSES TO PHQ QUESTIONS 1-9: 0

## 2023-01-30 NOTE — PROGRESS NOTES
Madyson Salazar (:  1995) is a 32 y.o. male,Established patient, here for evaluation of the following chief complaint(s):  Back Pain         ASSESSMENT/PLAN:  1. Chronic midline low back pain with bilateral sciatica  - Chronic, uncontrolled  - Symptoms are improving but remain daily  - Completed recommended PT treatments 1 week ago  - Referral placed to Dr. Claire Thakkar for further evaluation and treatment  - Previous xray revealed mild lower lumbar facet arthrosis at L5-S1 with suggested possible neuroforaminal narrowing at L5-S1  - Continue otc treatments, add lidocaine patches for additional pain relief  -     [facility]FL - Osbaldo Noguera MD, Orthopedic Surgery, [facility]  -     lidocaine 4 % external patch; Place 1 patch onto the skin daily, TransDERmal, DAILY Starting 2023, Until Wed 3/1/2023, For 30 days, Disp-30 patch, R-0, Normal    2. Psychophysiological insomnia  - Improved  - Continue nighty trazodone prn  - Sleep maintenance strategies encouraged    Return if symptoms worsen or fail to improve. Subjective   SUBJECTIVE/OBJECTIVE:  Patient presents for follow up evaluation of midline low back pain. Completed course of steroids and nsaids. PT completed last week. Stiffness and pain have improved but remain. Sleep has improved with increase dose of trazodone. Does not require nightly dosing. Feels rested upon waking. Back Pain  This is a chronic problem. The current episode started more than 1 year ago. The problem occurs constantly. The problem has been gradually improving since onset. The pain is present in the lumbar spine. The quality of the pain is described as aching. The pain radiates to the right thigh and left thigh. The pain is at a severity of 6/10 (pain previously never below a 7/10). The symptoms are aggravated by bending and sitting. Stiffness is present In the morning.  Pertinent negatives include no bladder incontinence, bowel incontinence, numbness, paresthesias or tingling. He has tried NSAIDs (Tylenol, prednisone, PT) for the symptoms. The treatment provided mild relief. Review of Systems   Gastrointestinal:  Negative for bowel incontinence. Genitourinary:  Negative for bladder incontinence. Musculoskeletal:  Positive for back pain. Negative for myalgias. Neurological:  Negative for tingling, numbness and paresthesias. Objective   Physical Exam  Vitals and nursing note reviewed. Constitutional:       General: He is not in acute distress. Appearance: Normal appearance. HENT:      Head: Normocephalic and atraumatic. Right Ear: External ear normal.      Left Ear: External ear normal.      Nose: No nasal deformity or rhinorrhea. Mouth/Throat:      Lips: Pink. No lesions. Eyes:      General: Lids are normal.         Right eye: No discharge. Left eye: No discharge. Conjunctiva/sclera: Conjunctivae normal.   Pulmonary:      Effort: No accessory muscle usage or respiratory distress. Breath sounds: No stridor. Musculoskeletal:      Cervical back: Normal range of motion. Lumbar back: Tenderness and bony tenderness present. Legs:    Skin:     Coloration: Skin is not pale. Findings: No rash. Neurological:      General: No focal deficit present. Mental Status: He is alert. Mental status is at baseline. Psychiatric:         Mood and Affect: Mood normal.         Behavior: Behavior is cooperative. An electronic signature was used to authenticate this note.     --DELLA Wolf - CNP

## 2023-03-22 ENCOUNTER — TELEPHONE (OUTPATIENT)
Dept: FAMILY MEDICINE CLINIC | Age: 28
End: 2023-03-22

## 2023-03-22 NOTE — TELEPHONE ENCOUNTER
Would recommend starting with ortho. Have him schedule appt. Consider pain management if needed.  Pain management also available at [facility]- may not require a separate referral.

## 2023-03-22 NOTE — TELEPHONE ENCOUNTER
Patient's wife Castro Stonertz called and reports patient has completed therapy. Patient is still c/o pain and is questioning a referral for pain management. Per Castro Bautista, patient still has not f/u with [facility]. Phone number for [facility] given.

## 2023-03-23 NOTE — TELEPHONE ENCOUNTER
Called patient number and they stated it was the wrong number.  [facility] sent to patient

## 2023-05-31 ENCOUNTER — APPOINTMENT (OUTPATIENT)
Dept: GENERAL RADIOLOGY | Age: 28
End: 2023-05-31

## 2023-05-31 ENCOUNTER — HOSPITAL ENCOUNTER (EMERGENCY)
Age: 28
Discharge: HOME | End: 2023-05-31

## 2023-05-31 VITALS
TEMPERATURE: 98.1 F | OXYGEN SATURATION: 99 % | SYSTOLIC BLOOD PRESSURE: 124 MMHG | HEART RATE: 80 BPM | RESPIRATION RATE: 16 BRPM | WEIGHT: 160 LBS | DIASTOLIC BLOOD PRESSURE: 60 MMHG | BODY MASS INDEX: 22.32 KG/M2

## 2023-05-31 DIAGNOSIS — S60.221A CONTUSION OF RIGHT HAND, INITIAL ENCOUNTER: Primary | ICD-10-CM

## 2023-05-31 PROCEDURE — 99284 EMERGENCY DEPT VISIT MOD MDM: CPT

## 2023-05-31 PROCEDURE — 96372 THER/PROPH/DIAG INJ SC/IM: CPT

## 2023-05-31 PROCEDURE — 6360000002 HC RX W HCPCS

## 2023-05-31 PROCEDURE — 73130 X-RAY EXAM OF HAND: CPT

## 2023-05-31 RX ORDER — KETOROLAC TROMETHAMINE 30 MG/ML
30 INJECTION, SOLUTION INTRAMUSCULAR; INTRAVENOUS ONCE
Status: COMPLETED | OUTPATIENT
Start: 2023-05-31 | End: 2023-05-31

## 2023-05-31 RX ORDER — NAPROXEN 500 MG/1
500 TABLET ORAL 2 TIMES DAILY PRN
Qty: 60 TABLET | Refills: 0 | Status: SHIPPED | OUTPATIENT
Start: 2023-05-31

## 2023-05-31 RX ADMIN — KETOROLAC TROMETHAMINE 30 MG: 30 INJECTION, SOLUTION INTRAMUSCULAR; INTRAVENOUS at 06:25

## 2023-05-31 ASSESSMENT — PAIN DESCRIPTION - FREQUENCY: FREQUENCY: CONTINUOUS

## 2023-05-31 ASSESSMENT — PAIN DESCRIPTION - LOCATION
LOCATION: HAND
LOCATION: HAND

## 2023-05-31 ASSESSMENT — PAIN DESCRIPTION - PAIN TYPE: TYPE: ACUTE PAIN

## 2023-05-31 ASSESSMENT — PAIN DESCRIPTION - ORIENTATION
ORIENTATION: RIGHT
ORIENTATION: RIGHT

## 2023-05-31 ASSESSMENT — PAIN SCALES - GENERAL
PAINLEVEL_OUTOF10: 7
PAINLEVEL_OUTOF10: 7

## 2023-05-31 ASSESSMENT — PAIN - FUNCTIONAL ASSESSMENT
PAIN_FUNCTIONAL_ASSESSMENT: NONE - DENIES PAIN
PAIN_FUNCTIONAL_ASSESSMENT: 0-10

## 2023-05-31 ASSESSMENT — PAIN DESCRIPTION - DESCRIPTORS: DESCRIPTORS: ACHING

## 2023-05-31 NOTE — ED TRIAGE NOTES
Patient ambulates into the ED with chief complaint of a right hand injury. Patient reports he smashed his hand between a door frame and a couch that he was moving. Patient reports the injury occurred last night around 2000. Radial pulses are strong/hand is warm. VSS.

## 2023-05-31 NOTE — DISCHARGE INSTRUCTIONS
Return to emergency department for worsening/uncontrolled pain. Return for additional injury or medical concern. Follow-up with your primary care provider in 3 days for reevaluation/ongoing outpatient management.

## 2023-05-31 NOTE — ED PROVIDER NOTES
Mental Status: He is alert and oriented to person, place, and time. Psychiatric:         Mood and Affect: Mood normal.         Behavior: Behavior normal.     FORMAL DIAGNOSTIC RESULTS     RADIOLOGY: Interpretation per the Radiologist below, if available at the time of this note (none if blank):  XR HAND RIGHT (MIN 3 VIEWS)   Final Result      No acute osseous abnormality. Negative ulnar variance. This document has been electronically signed by: Isael Persaud MD on    05/31/2023 06:48 AM        LABS: (none if blank)  Labs Reviewed - No data to display  (Any cultures that may have been sent were not resulted at the time of this patient visit)  [address] / ED COURSE:     1) Number and Complexity of Problems      Problem List This Visit:         Chief Complaint   Patient presents with    Hand Injury     Right hand         Differential Diagnosis includes (but not limited to):  Right hand contusion        Diagnoses Considered but I have low suspicion of:   Acute fracture             Pertinent Comorbid Conditions:    N/A  2)  Data Reviewed (none if left blank)        My Independent interpretations:     EKG:      N/A    Imaging: Right hand plain film demonstrates no acute fracture or malalignment or soft tissue swelling. Labs:      na          Decision Rules/Clinical Scores utilized:  na          External Documentation Reviewed:         Previous patient encounter documents & history available on EMR was reviewed          See Formal Diagnostic Results above for the lab and radiology tests and orders.   3)  Treatment and Disposition         ED Reassessment:           Case discussed with consulting clinician:  shellie         Shared Decision-Making was performed and disposition discussed with the        Patient/Family and questions answered         Social determinants of health impacting treatment or disposition:  na         Code Status: Not discussed during this ER encounter    Summary of Patient

## 2023-06-01 ENCOUNTER — APPOINTMENT (OUTPATIENT)
Dept: GENERAL RADIOLOGY | Age: 28
End: 2023-06-01
Payer: COMMERCIAL

## 2023-06-01 ENCOUNTER — HOSPITAL ENCOUNTER (EMERGENCY)
Age: 28
Discharge: HOME | End: 2023-06-01
Payer: COMMERCIAL

## 2023-06-01 VITALS
TEMPERATURE: 98.5 F | RESPIRATION RATE: 18 BRPM | SYSTOLIC BLOOD PRESSURE: 126 MMHG | HEART RATE: 68 BPM | DIASTOLIC BLOOD PRESSURE: 76 MMHG | OXYGEN SATURATION: 96 %

## 2023-06-01 DIAGNOSIS — S60.221A CONTUSION OF RIGHT HAND, INITIAL ENCOUNTER: ICD-10-CM

## 2023-06-01 DIAGNOSIS — M79.641 RIGHT HAND PAIN: Primary | ICD-10-CM

## 2023-06-01 PROCEDURE — 6370000000 HC RX 637 (ALT 250 FOR IP): Performed by: NURSE PRACTITIONER

## 2023-06-01 PROCEDURE — 99283 EMERGENCY DEPT VISIT LOW MDM: CPT

## 2023-06-01 PROCEDURE — 73130 X-RAY EXAM OF HAND: CPT

## 2023-06-01 RX ORDER — HYDROCODONE BITARTRATE AND ACETAMINOPHEN 5; 325 MG/1; MG/1
1 TABLET ORAL ONCE
Status: COMPLETED | OUTPATIENT
Start: 2023-06-01 | End: 2023-06-01

## 2023-06-01 RX ORDER — DIAPER,BRIEF,INFANT-TODD,DISP
EACH MISCELLANEOUS
Qty: 14 G | Refills: 0 | Status: SHIPPED | OUTPATIENT
Start: 2023-06-01 | End: 2023-06-08

## 2023-06-01 RX ADMIN — HYDROCODONE BITARTRATE AND ACETAMINOPHEN 1 TABLET: 5; 325 TABLET ORAL at 20:11

## 2023-06-01 ASSESSMENT — PAIN DESCRIPTION - ORIENTATION: ORIENTATION: RIGHT

## 2023-06-01 ASSESSMENT — PAIN DESCRIPTION - LOCATION: LOCATION: HAND

## 2023-06-01 ASSESSMENT — PAIN - FUNCTIONAL ASSESSMENT: PAIN_FUNCTIONAL_ASSESSMENT: 0-10

## 2023-06-01 ASSESSMENT — PAIN SCALES - GENERAL: PAINLEVEL_OUTOF10: 8

## 2023-06-01 NOTE — ED PROVIDER NOTES
Patient Presentation:      MDM  Number of Diagnoses or Management Options  Diagnosis management comments: Patient is a 80-year-old male with a history of anxiety, insomnia, depression, and ADHD that presents to the ER today for right hand pain and swelling. Appropriate imaging and medications ordered. X-ray as read by radiologist as there is soft tissue swelling over the dorsum of the hand worse than compared to yesterday, no definitive fracture or other bony abnormalities noted, suggest MRI. Right thumb spica ordered. Patient be discharged home to significant other's care. Plan of care discussed with patient was in agreements. Prescription sent to preferred pharmacy. To follow-up with [facility] tomorrow without fail. Strict return instructions provided. Amount and/or Complexity of Data Reviewed  Tests in the radiology section of CPT®: ordered and reviewed  Decide to obtain previous medical records or to obtain history from someone other than the patient: no  Obtain history from someone other than the patient: no  Review and summarize past medical records: yes  Independent visualization of images, tracings, or specimens: yes    Risk of Complications, Morbidity, and/or Mortality  Presenting problems: low  Diagnostic procedures: low  Management options: low    /  ED Course as of 06/01/23 2047   Thu Jun 01, 2023 2023 XR HAND RIGHT (MIN 3 VIEWS)     1. There is soft tissue swelling over the dorsum of the hand worse than on previous study dated 5/31/2023.  2. There is no definite fracture or other bony abnormality noted. 3. MRI scan may be helpful for better evaluation if clinically indicated. [SC]      ED Course User Index  [SC] Charito Weathers, APRN - CNP     Vitals Reviewed:    Vitals:    06/01/23 1941   BP: 126/76   Pulse: 68   Resp: 18   Temp: 98.5 °F (36.9 °C)   TempSrc: Oral   SpO2: 96%       The patient was seen and examined.  Appropriate diagnostic testing was performed and results reviewed with the

## 2023-06-01 NOTE — ED TRIAGE NOTES
Pt presents to the ER with c/o right hand pain and swelling that started yesterday. Pt states he \"caught a cabinet yesterday\" but is unsure if that is related.  Pt is alert, vss

## 2023-06-02 NOTE — DISCHARGE INSTRUCTIONS
Stay, stay well-hydrated. Continue home medications as previously prescribed. Naproxen twice daily as needed for pain as previously prescribed. Keep splint in place when in use, you may remove when sleeping. Please follow-up with [facility] tomorrow for walk-in clinic without fail. If any worsening or concerns return to the ER immediately. Hydrocortisone cream 2-3 times daily for the next 5 to 7 days.

## 2023-11-13 ENCOUNTER — OFFICE VISIT (OUTPATIENT)
Dept: FAMILY MEDICINE CLINIC | Age: 28
End: 2023-11-13
Payer: COMMERCIAL

## 2023-11-13 VITALS
HEART RATE: 76 BPM | RESPIRATION RATE: 18 BRPM | DIASTOLIC BLOOD PRESSURE: 70 MMHG | WEIGHT: 184 LBS | BODY MASS INDEX: 25.66 KG/M2 | SYSTOLIC BLOOD PRESSURE: 122 MMHG

## 2023-11-13 DIAGNOSIS — M54.41 CHRONIC MIDLINE LOW BACK PAIN WITH BILATERAL SCIATICA: ICD-10-CM

## 2023-11-13 DIAGNOSIS — F51.04 PSYCHOPHYSIOLOGICAL INSOMNIA: ICD-10-CM

## 2023-11-13 DIAGNOSIS — M54.42 CHRONIC MIDLINE LOW BACK PAIN WITH BILATERAL SCIATICA: ICD-10-CM

## 2023-11-13 DIAGNOSIS — F41.9 ANXIETY: ICD-10-CM

## 2023-11-13 DIAGNOSIS — G89.29 CHRONIC MIDLINE LOW BACK PAIN WITH BILATERAL SCIATICA: ICD-10-CM

## 2023-11-13 DIAGNOSIS — R45.89 DYSPHORIC MOOD: ICD-10-CM

## 2023-11-13 DIAGNOSIS — Z00.00 WELL ADULT EXAM: Primary | ICD-10-CM

## 2023-11-13 PROCEDURE — 99395 PREV VISIT EST AGE 18-39: CPT | Performed by: NURSE PRACTITIONER

## 2023-11-13 PROCEDURE — 99214 OFFICE O/P EST MOD 30 MIN: CPT | Performed by: NURSE PRACTITIONER

## 2023-11-13 RX ORDER — BUSPIRONE HYDROCHLORIDE 10 MG/1
10 TABLET ORAL 3 TIMES DAILY
Qty: 90 TABLET | Refills: 5 | Status: SHIPPED | OUTPATIENT
Start: 2023-11-13 | End: 2024-05-11

## 2023-11-13 RX ORDER — NAPROXEN 250 MG/1
500 TABLET ORAL 2 TIMES DAILY PRN
Qty: 60 TABLET | Refills: 3 | Status: SHIPPED | OUTPATIENT
Start: 2023-11-13 | End: 2024-01-12

## 2023-11-13 RX ORDER — TRAZODONE HYDROCHLORIDE 100 MG/1
100 TABLET ORAL NIGHTLY
Qty: 30 TABLET | Refills: 5 | Status: SHIPPED | OUTPATIENT
Start: 2023-11-13 | End: 2024-05-11

## 2023-11-13 SDOH — ECONOMIC STABILITY: INCOME INSECURITY: HOW HARD IS IT FOR YOU TO PAY FOR THE VERY BASICS LIKE FOOD, HOUSING, MEDICAL CARE, AND HEATING?: SOMEWHAT HARD

## 2023-11-13 SDOH — ECONOMIC STABILITY: HOUSING INSECURITY
IN THE LAST 12 MONTHS, WAS THERE A TIME WHEN YOU DID NOT HAVE A STEADY PLACE TO SLEEP OR SLEPT IN A SHELTER (INCLUDING NOW)?: YES

## 2023-11-13 SDOH — ECONOMIC STABILITY: FOOD INSECURITY: WITHIN THE PAST 12 MONTHS, YOU WORRIED THAT YOUR FOOD WOULD RUN OUT BEFORE YOU GOT MONEY TO BUY MORE.: NEVER TRUE

## 2023-11-13 SDOH — ECONOMIC STABILITY: FOOD INSECURITY: WITHIN THE PAST 12 MONTHS, THE FOOD YOU BOUGHT JUST DIDN'T LAST AND YOU DIDN'T HAVE MONEY TO GET MORE.: NEVER TRUE

## 2023-11-13 ASSESSMENT — ENCOUNTER SYMPTOMS: BACK PAIN: 1

## 2024-01-16 ENCOUNTER — OFFICE VISIT (OUTPATIENT)
Dept: FAMILY MEDICINE CLINIC | Age: 29
End: 2024-01-16
Payer: COMMERCIAL

## 2024-01-16 VITALS
RESPIRATION RATE: 20 BRPM | DIASTOLIC BLOOD PRESSURE: 70 MMHG | WEIGHT: 177 LBS | SYSTOLIC BLOOD PRESSURE: 128 MMHG | HEART RATE: 78 BPM | BODY MASS INDEX: 24.69 KG/M2

## 2024-01-16 DIAGNOSIS — J01.10 ACUTE NON-RECURRENT FRONTAL SINUSITIS: Primary | ICD-10-CM

## 2024-01-16 DIAGNOSIS — K21.9 GASTROESOPHAGEAL REFLUX DISEASE, UNSPECIFIED WHETHER ESOPHAGITIS PRESENT: ICD-10-CM

## 2024-01-16 LAB
Lab: NORMAL
QC PASS/FAIL: NORMAL
SARS-COV-2 RDRP RESP QL NAA+PROBE: NEGATIVE

## 2024-01-16 PROCEDURE — 87635 SARS-COV-2 COVID-19 AMP PRB: CPT | Performed by: NURSE PRACTITIONER

## 2024-01-16 PROCEDURE — 99213 OFFICE O/P EST LOW 20 MIN: CPT | Performed by: NURSE PRACTITIONER

## 2024-01-16 RX ORDER — GUAIFENESIN 400 MG/1
400 TABLET ORAL 4 TIMES DAILY PRN
Qty: 30 TABLET | Refills: 0 | Status: SHIPPED | OUTPATIENT
Start: 2024-01-16

## 2024-01-16 RX ORDER — PREDNISONE 20 MG/1
TABLET ORAL
Qty: 15 TABLET | Refills: 0 | Status: SHIPPED | OUTPATIENT
Start: 2024-01-16 | End: 2024-01-25

## 2024-01-16 RX ORDER — FAMOTIDINE 20 MG/1
20 TABLET, FILM COATED ORAL 2 TIMES DAILY PRN
Qty: 60 TABLET | Refills: 3 | Status: SHIPPED | OUTPATIENT
Start: 2024-01-16

## 2024-01-16 ASSESSMENT — ENCOUNTER SYMPTOMS
ABDOMINAL PAIN: 1
COUGH: 0
SORE THROAT: 0
SINUS COMPLAINT: 1
NAUSEA: 1
VOMITING: 1
SINUS PRESSURE: 1

## 2024-01-16 ASSESSMENT — PATIENT HEALTH QUESTIONNAIRE - PHQ9
SUM OF ALL RESPONSES TO PHQ QUESTIONS 1-9: 0
2. FEELING DOWN, DEPRESSED OR HOPELESS: 0
SUM OF ALL RESPONSES TO PHQ QUESTIONS 1-9: 0
SUM OF ALL RESPONSES TO PHQ QUESTIONS 1-9: 0
1. LITTLE INTEREST OR PLEASURE IN DOING THINGS: 0
SUM OF ALL RESPONSES TO PHQ QUESTIONS 1-9: 0
SUM OF ALL RESPONSES TO PHQ9 QUESTIONS 1 & 2: 0

## 2024-01-16 NOTE — PROGRESS NOTES
Christopher Lawson (:  1995) is a 28 y.o. male,Established patient, here for evaluation of the following chief complaint(s):  Sinus Problem (Headache, chest congestion )         ASSESSMENT/PLAN:  1. Acute non-recurrent frontal sinusitis  - Acute  - Symptoms consistent with a viral sinusitis  - COVID-19 testing is negative  - Symptomatic treatment with guaifenesin and prednisone  - Notify office if symptoms do not resolve  -     guaiFENesin 400 MG tablet; Take 1 tablet by mouth 4 times daily as needed for Cough, Disp-30 tablet, R-0Normal  -     predniSONE (DELTASONE) 20 MG tablet; Take 1 tablet by mouth 2 times daily at 0800 and 1400 for 5 days, THEN 1 tablet daily for 5 days., Disp-15 tablet, R-0Normal    2. Gastroesophageal reflux disease, unspecified whether esophagitis present   - New  - Start prn pepcid prior to meals which that exacerbate symptoms  -  Eat smaller meals, more often.  -  Limit foods and drinks that seem to make condition worse. These foods may include chocolate, spicy foods, and sodas that have caffeine. Other high-acid foods are oranges and tomatoes.  -  Try to eat at least 2 to 3 hours before bedtime. This helps lower the amount of acid in the stomach when you lay down.  -  Nonpharmacologic treatments encouraged, including: eating smaller meals, elevation of the head of bed at night, avoidance of caffeine, chocolate, nicotine and peppermint, and avoiding tight fitting clothing.      Return if symptoms worsen or fail to improve.         Subjective   SUBJECTIVE/OBJECTIVE:  Patient presents for evaluation of an acute illness. Symptoms started this morning.     C/o burning pain after meals. Pizza and chili both exacerbated symptoms.     Sinus Problem  This is a new problem. The current episode started in the past 7 days (2 days ago). The problem has been gradually worsening since onset. There has been no fever. Associated symptoms include congestion, diaphoresis, headaches and sinus pressure

## 2024-07-30 ENCOUNTER — HOSPITAL ENCOUNTER (EMERGENCY)
Age: 29
Discharge: HOME | End: 2024-07-30
Payer: COMMERCIAL

## 2024-07-30 VITALS
DIASTOLIC BLOOD PRESSURE: 57 MMHG | SYSTOLIC BLOOD PRESSURE: 116 MMHG | RESPIRATION RATE: 20 BRPM | OXYGEN SATURATION: 98 % | HEART RATE: 67 BPM | BODY MASS INDEX: 23.71 KG/M2 | WEIGHT: 170 LBS | TEMPERATURE: 98.9 F

## 2024-07-30 DIAGNOSIS — S91.331A PUNCTURE WOUND OF RIGHT FOOT, INITIAL ENCOUNTER: Primary | ICD-10-CM

## 2024-07-30 PROCEDURE — 99213 OFFICE O/P EST LOW 20 MIN: CPT | Performed by: NURSE PRACTITIONER

## 2024-07-30 PROCEDURE — 6360000002 HC RX W HCPCS: Performed by: NURSE PRACTITIONER

## 2024-07-30 PROCEDURE — 90471 IMMUNIZATION ADMIN: CPT | Performed by: NURSE PRACTITIONER

## 2024-07-30 PROCEDURE — 90715 TDAP VACCINE 7 YRS/> IM: CPT | Performed by: NURSE PRACTITIONER

## 2024-07-30 PROCEDURE — 99213 OFFICE O/P EST LOW 20 MIN: CPT

## 2024-07-30 RX ORDER — CEPHALEXIN 500 MG/1
500 CAPSULE ORAL 2 TIMES DAILY
Qty: 14 CAPSULE | Refills: 0 | Status: SHIPPED | OUTPATIENT
Start: 2024-07-30 | End: 2024-08-06

## 2024-07-30 RX ADMIN — TETANUS TOXOID, REDUCED DIPHTHERIA TOXOID AND ACELLULAR PERTUSSIS VACCINE, ADSORBED 0.5 ML: 5; 2.5; 8; 8; 2.5 SUSPENSION INTRAMUSCULAR at 18:40

## 2024-07-30 ASSESSMENT — ENCOUNTER SYMPTOMS
DIARRHEA: 0
VOMITING: 0
CHEST TIGHTNESS: 0
SORE THROAT: 0
SHORTNESS OF BREATH: 0
RHINORRHEA: 0
NAUSEA: 0
COUGH: 0

## 2024-07-30 ASSESSMENT — PAIN DESCRIPTION - LOCATION: LOCATION: FOOT

## 2024-07-30 ASSESSMENT — PAIN - FUNCTIONAL ASSESSMENT
PAIN_FUNCTIONAL_ASSESSMENT: 0-10
PAIN_FUNCTIONAL_ASSESSMENT: ACTIVITIES ARE NOT PREVENTED

## 2024-07-30 ASSESSMENT — PAIN DESCRIPTION - ORIENTATION: ORIENTATION: RIGHT

## 2024-07-30 ASSESSMENT — PAIN DESCRIPTION - FREQUENCY: FREQUENCY: CONTINUOUS

## 2024-07-30 ASSESSMENT — PAIN DESCRIPTION - PAIN TYPE: TYPE: ACUTE PAIN

## 2024-07-30 ASSESSMENT — PAIN SCALES - GENERAL: PAINLEVEL_OUTOF10: 6

## 2024-07-30 ASSESSMENT — PAIN DESCRIPTION - DESCRIPTORS: DESCRIPTORS: ACHING

## 2024-07-30 NOTE — ED PROVIDER NOTES
[facility] URGENT CARE  Urgent Care Encounter       CHIEF COMPLAINT       Chief Complaint   Patient presents with    Puncture Wound     Heel Right foot         Nurses Notes reviewed and I agree except as noted in the HPI.  HISTORY OF PRESENT ILLNESS   Christopher Lawson is a 29 y.o. male who presents to the [facility] urgent care for evaluation of a puncture wound on the right heel.  Reports this occurred shortly prior to arrival.  Reports that his last tetanus vaccine was greater than 5 years.  He reports that he was walking out of his house and stepped on a beony nail on his porch.    The history is provided by the patient. No  was used.       REVIEW OF SYSTEMS     Review of Systems   Constitutional:  Negative for activity change, appetite change, chills, fatigue and fever.   HENT:  Negative for ear discharge, ear pain, rhinorrhea and sore throat.    Respiratory:  Negative for cough, chest tightness and shortness of breath.    Cardiovascular:  Negative for chest pain.   Gastrointestinal:  Negative for diarrhea, nausea and vomiting.   Genitourinary:  Negative for dysuria.   Skin:  Positive for wound. Negative for rash.   Allergic/Immunologic: Negative for environmental allergies and food allergies.   Neurological:  Negative for dizziness and headaches.       PAST MEDICAL HISTORY         Diagnosis Date    ADHD (attention deficit hyperactivity disorder)     Anxiety     Chronic back pain     Depression        SURGICALHISTORY     Patient  has a past surgical history that includes hernia repair (2009).    CURRENT MEDICATIONS       Previous Medications    FAMOTIDINE (PEPCID) 20 MG TABLET    Take 1 tablet by mouth 2 times daily as needed (GERD)    GUAIFENESIN 400 MG TABLET    Take 1 tablet by mouth 4 times daily as needed for Cough    TRAZODONE (DESYREL) 100 MG TABLET    Take 1 tablet by mouth nightly       ALLERGIES     Patient is has No Known Allergies.    Patients   Immunization History

## 2024-07-30 NOTE — ED TRIAGE NOTES
Patient to room with c/o puncture wound to heel of right foot. States while walking out of his home, he stepped on a ebony nail. Wound cleansed with wound wash.

## 2024-11-12 ENCOUNTER — OFFICE VISIT (OUTPATIENT)
Dept: FAMILY MEDICINE CLINIC | Age: 29
End: 2024-11-12

## 2024-11-12 VITALS
RESPIRATION RATE: 20 BRPM | HEART RATE: 76 BPM | HEIGHT: 71 IN | DIASTOLIC BLOOD PRESSURE: 76 MMHG | SYSTOLIC BLOOD PRESSURE: 118 MMHG | BODY MASS INDEX: 22.26 KG/M2 | WEIGHT: 159 LBS

## 2024-11-12 DIAGNOSIS — F41.9 ANXIETY: ICD-10-CM

## 2024-11-12 DIAGNOSIS — R41.840 CONCENTRATION DEFICIT: ICD-10-CM

## 2024-11-12 DIAGNOSIS — Z00.00 WELL ADULT EXAM: Primary | ICD-10-CM

## 2024-11-12 DIAGNOSIS — R21 FACIAL RASH: ICD-10-CM

## 2024-11-12 DIAGNOSIS — R45.89 DYSPHORIC MOOD: ICD-10-CM

## 2024-11-12 RX ORDER — PREDNISONE 20 MG/1
TABLET ORAL
Qty: 15 TABLET | Refills: 0 | Status: SHIPPED | OUTPATIENT
Start: 2024-11-12 | End: 2024-11-21

## 2024-11-12 RX ORDER — BUPROPION HYDROCHLORIDE 150 MG/1
150 TABLET ORAL EVERY MORNING
Qty: 30 TABLET | Refills: 0 | Status: SHIPPED | OUTPATIENT
Start: 2024-11-12

## 2024-11-12 RX ORDER — BUSPIRONE HYDROCHLORIDE 10 MG/1
10 TABLET ORAL 3 TIMES DAILY
Qty: 270 TABLET | Refills: 1 | Status: SHIPPED | OUTPATIENT
Start: 2024-11-12 | End: 2025-05-11

## 2024-11-12 ASSESSMENT — ENCOUNTER SYMPTOMS: SHORTNESS OF BREATH: 0

## 2024-11-12 NOTE — PROGRESS NOTES
[facility] PROFESSIONAL [place] [address] [facility] [address]  Dept: [phone]  Dept Fax: [phone]  Loc: [phone]     Visit Date:  11/12/2024    Patient:  Christopher Lawson  YOB: 1995  Age: 29 y.o.  Gender: male  BMI: Body mass index is 22.18 kg/m².    Christopher Lawson, Established patient, is being seen today for   Chief Complaint   Patient presents with    Annual Exam     Discuss ADHD, would like to start medication. Rash on face, new unsure of cause.      .     Assessment/Plan      1. Well adult exam  - Age-appropriate education provided  - Health maintenance reviewed  - Encourage a healthy diet including regular dietary intake of fresh fruits and non-starchy vegetables  - Encourage routine aerobic exercise 3-4x per week for 30-40 minutes at a time    2. Anxiety  - Chronic, uncontrolled  - Add bupropion  - Continue buspar  - busPIRone (BUSPAR) 10 MG tablet; Take 1 tablet by mouth 3 times daily  Dispense: 270 tablet; Refill: 1    3. Facial rash  - Acute  - Improving  - Prednisone if symptoms worsen or do not heal  - Avoid picking    4. Concentration deficit  - Chronic, uncontrolled  - Bupropion for symptom management  - Likely will require dose increase after original rx completed  - buPROPion (WELLBUTRIN XL) 150 MG extended release tablet; Take 1 tablet by mouth every morning  Dispense: 30 tablet; Refill: 0    5. Dysphoric mood  - buPROPion (WELLBUTRIN XL) 150 MG extended release tablet; Take 1 tablet by mouth every morning  Dispense: 30 tablet; Refill: 0      Return in about 6 months (around 5/12/2025) for Chronic Conditions.    HPI:     Patient presents today for an annual physical examination. Health maintenance reviewed. Needs updated blood work and medication refills. Reports worsening of anxiety and concentration. States he was diagnosed with adhd as a kid.     Diet: Moderately healthy  Exercise: moderately

## 2024-12-19 ENCOUNTER — HOSPITAL ENCOUNTER (EMERGENCY)
Age: 29
Discharge: HOME | End: 2024-12-19
Payer: COMMERCIAL

## 2024-12-19 VITALS
RESPIRATION RATE: 18 BRPM | WEIGHT: 160 LBS | DIASTOLIC BLOOD PRESSURE: 76 MMHG | BODY MASS INDEX: 22.32 KG/M2 | SYSTOLIC BLOOD PRESSURE: 120 MMHG | OXYGEN SATURATION: 97 % | HEART RATE: 58 BPM | TEMPERATURE: 97.6 F

## 2024-12-19 DIAGNOSIS — K64.4 EXTERNAL HEMORRHOID: Primary | ICD-10-CM

## 2024-12-19 PROCEDURE — 99283 EMERGENCY DEPT VISIT LOW MDM: CPT

## 2024-12-19 RX ORDER — HYDROCORTISONE 25 MG/G
CREAM TOPICAL
Qty: 28 G | Refills: 0 | Status: SHIPPED | OUTPATIENT
Start: 2024-12-19

## 2024-12-19 ASSESSMENT — PAIN - FUNCTIONAL ASSESSMENT: PAIN_FUNCTIONAL_ASSESSMENT: 0-10

## 2024-12-19 ASSESSMENT — PAIN SCALES - GENERAL: PAINLEVEL_OUTOF10: 5

## 2024-12-19 NOTE — ED PROVIDER NOTES
The patient was seen and examined. Appropriate diagnostic testing was performed and results reviewed with the patient.      The results of pertinent diagnostic studies and exam findings were discussed. The patient’s provisional diagnosis and plan of care were discussed with the patient and present family who expressed understanding. Any medications were reviewed and indications and risks of medications were discussed with the patient /family present. Strict verbal and written return precautions, instructions and appropriate follow-up provided to  the patient   .     ED Medications administered this visit:  (None if blank)  Medications - No data to display      PROCEDURES: (None if blank)  Procedures:     CRITICAL CARE: (None if blank)      DISCHARGE PRESCRIPTIONS: (None if blank)  New Prescriptions    HYDROCORTISONE (ANUSOL-HC) 2.5 % CREA RECTAL CREAM    Apply to affected area 2-3 times per day for up to 2 weeks       FINAL IMPRESSION      1. External hemorrhoid          DISPOSITION/PLAN   DISPOSITION Decision To Discharge 12/19/2024 06:11:50 AM   DISPOSITION CONDITION Stable           OUTPATIENT FOLLOW UP THE PATIENT:  Jose Manuel Coelho MD  [address]  [facility] [address]  [phone]    Schedule an appointment as soon as possible for a visit in 1 week  As needed, If symptoms worsen    Bruce Steel, APRN - CNP  [address]  [facility] [address]  [phone]    Schedule an appointment as soon as possible for a visit   As needed    [facility] EMERGENCY DEPT  [address] [place] [address]  [phone]  Go to   As needed, If symptoms worsen      KARTHIK Gonzales Robert A, PA  12/19/24 2175

## 2024-12-19 NOTE — DISCHARGE INSTRUCTIONS
Avoid heavy lifting, you can try Epsom salt baths 1-2 times per day as well.  Apply the Anusol HC 2-3 times daily for up to 2 weeks.  Follow-up with the general surgeon, Dr. Coelho as needed.    Discharge warning    Please remember that examination and testing performed in the emergency department is not a comprehensive evaluation of all medical conditions and does not replace the need to follow up with your primary care provider.  In the emergency department, we are only able to evaluate your symptoms in the current condition, but symptoms may change or worsen.  Although you are felt safe to be discharged today, if your symptoms persist or change, you need to be re-evaluated by your regular/primary care doctor as soon as possible.  If you are unable to make appointment with your regular doctor, please come back to the ER to be re-evaluated.     Walk in

## 2024-12-19 NOTE — ED TRIAGE NOTES
Pt presents to the ED for evaluation of  rectal discomfort. He states he had a bowel movement this morning and when he cleaned himself he felt something that he normally does not. He reports there was a small pink streak on the toilet paper. He experiences discomfort when ambulating near his rectum. VSS. GCS15.

## 2025-01-28 DIAGNOSIS — R45.89 DYSPHORIC MOOD: ICD-10-CM

## 2025-01-28 DIAGNOSIS — R41.840 CONCENTRATION DEFICIT: ICD-10-CM

## 2025-01-28 RX ORDER — FAMOTIDINE 20 MG/1
20 TABLET, FILM COATED ORAL 2 TIMES DAILY PRN
Qty: 180 TABLET | Refills: 0 | Status: SHIPPED | OUTPATIENT
Start: 2025-01-28

## 2025-01-28 RX ORDER — BUPROPION HYDROCHLORIDE 300 MG/1
300 TABLET ORAL EVERY MORNING
Qty: 90 TABLET | Refills: 0 | Status: SHIPPED | OUTPATIENT
Start: 2025-01-28 | End: 2025-04-28

## 2025-01-28 NOTE — TELEPHONE ENCOUNTER
Christopher Lawson called requesting a refill on the following medications:  Requested Prescriptions     Pending Prescriptions Disp Refills    famotidine (PEPCID) 20 MG tablet 60 tablet 3     Sig: Take 1 tablet by mouth 2 times daily as needed (GERD)    buPROPion (WELLBUTRIN XL) 150 MG extended release tablet 30 tablet 0     Sig: Take 1 tablet by mouth every morning       Date of last visit: 11/12/2024  Date of next visit (if applicable):Visit date not found  Date of last refill: 1/16/2024, 11/12/2024  Pharmacy Name: Samantha Gimenez MA

## 2025-02-20 ENCOUNTER — OFFICE VISIT (OUTPATIENT)
Dept: FAMILY MEDICINE CLINIC | Age: 30
End: 2025-02-20
Payer: COMMERCIAL

## 2025-02-20 VITALS
BODY MASS INDEX: 22.18 KG/M2 | OXYGEN SATURATION: 99 % | HEART RATE: 60 BPM | WEIGHT: 159 LBS | SYSTOLIC BLOOD PRESSURE: 120 MMHG | DIASTOLIC BLOOD PRESSURE: 72 MMHG | RESPIRATION RATE: 16 BRPM

## 2025-02-20 DIAGNOSIS — Z83.3 FAMILY HISTORY OF DIABETES MELLITUS: ICD-10-CM

## 2025-02-20 DIAGNOSIS — M54.42 CHRONIC MIDLINE LOW BACK PAIN WITH BILATERAL SCIATICA: Primary | ICD-10-CM

## 2025-02-20 DIAGNOSIS — R41.840 CONCENTRATION DEFICIT: ICD-10-CM

## 2025-02-20 DIAGNOSIS — R45.89 DYSPHORIC MOOD: ICD-10-CM

## 2025-02-20 DIAGNOSIS — F41.9 ANXIETY: ICD-10-CM

## 2025-02-20 DIAGNOSIS — M54.41 CHRONIC MIDLINE LOW BACK PAIN WITH BILATERAL SCIATICA: Primary | ICD-10-CM

## 2025-02-20 DIAGNOSIS — G89.29 CHRONIC MIDLINE LOW BACK PAIN WITH BILATERAL SCIATICA: Primary | ICD-10-CM

## 2025-02-20 PROCEDURE — G8427 DOCREV CUR MEDS BY ELIG CLIN: HCPCS | Performed by: NURSE PRACTITIONER

## 2025-02-20 PROCEDURE — G8420 CALC BMI NORM PARAMETERS: HCPCS | Performed by: NURSE PRACTITIONER

## 2025-02-20 PROCEDURE — 4004F PT TOBACCO SCREEN RCVD TLK: CPT | Performed by: NURSE PRACTITIONER

## 2025-02-20 PROCEDURE — 99214 OFFICE O/P EST MOD 30 MIN: CPT | Performed by: NURSE PRACTITIONER

## 2025-02-20 RX ORDER — NAPROXEN 250 MG/1
500 TABLET ORAL 2 TIMES DAILY PRN
Qty: 100 TABLET | Refills: 5 | Status: SHIPPED | OUTPATIENT
Start: 2025-02-20

## 2025-02-20 RX ORDER — BUSPIRONE HYDROCHLORIDE 10 MG/1
10 TABLET ORAL 3 TIMES DAILY
Qty: 270 TABLET | Refills: 2 | Status: SHIPPED | OUTPATIENT
Start: 2025-02-20

## 2025-02-20 RX ORDER — FAMOTIDINE 20 MG/1
20 TABLET, FILM COATED ORAL 2 TIMES DAILY PRN
Qty: 180 TABLET | Refills: 2 | Status: SHIPPED | OUTPATIENT
Start: 2025-02-20 | End: 2025-11-17

## 2025-02-20 RX ORDER — BUPROPION HYDROCHLORIDE 300 MG/1
300 TABLET ORAL EVERY MORNING
Qty: 90 TABLET | Refills: 2 | Status: SHIPPED | OUTPATIENT
Start: 2025-02-20 | End: 2025-11-17

## 2025-02-20 SDOH — ECONOMIC STABILITY: INCOME INSECURITY: IN THE LAST 12 MONTHS, WAS THERE A TIME WHEN YOU WERE NOT ABLE TO PAY THE MORTGAGE OR RENT ON TIME?: YES

## 2025-02-20 SDOH — ECONOMIC STABILITY: FOOD INSECURITY: WITHIN THE PAST 12 MONTHS, THE FOOD YOU BOUGHT JUST DIDN'T LAST AND YOU DIDN'T HAVE MONEY TO GET MORE.: OFTEN TRUE

## 2025-02-20 SDOH — ECONOMIC STABILITY: TRANSPORTATION INSECURITY
IN THE PAST 12 MONTHS, HAS THE LACK OF TRANSPORTATION KEPT YOU FROM MEDICAL APPOINTMENTS OR FROM GETTING MEDICATIONS?: NO

## 2025-02-20 SDOH — ECONOMIC STABILITY: FOOD INSECURITY: WITHIN THE PAST 12 MONTHS, YOU WORRIED THAT YOUR FOOD WOULD RUN OUT BEFORE YOU GOT MONEY TO BUY MORE.: OFTEN TRUE

## 2025-02-20 SDOH — ECONOMIC STABILITY: TRANSPORTATION INSECURITY
IN THE PAST 12 MONTHS, HAS LACK OF TRANSPORTATION KEPT YOU FROM MEETINGS, WORK, OR FROM GETTING THINGS NEEDED FOR DAILY LIVING?: NO

## 2025-02-20 ASSESSMENT — PATIENT HEALTH QUESTIONNAIRE - PHQ9
2. FEELING DOWN, DEPRESSED OR HOPELESS: SEVERAL DAYS
SUM OF ALL RESPONSES TO PHQ9 QUESTIONS 1 & 2: 1
1. LITTLE INTEREST OR PLEASURE IN DOING THINGS: NOT AT ALL
SUM OF ALL RESPONSES TO PHQ QUESTIONS 1-9: 1
SUM OF ALL RESPONSES TO PHQ QUESTIONS 1-9: 1
2. FEELING DOWN, DEPRESSED OR HOPELESS: SEVERAL DAYS
SUM OF ALL RESPONSES TO PHQ QUESTIONS 1-9: 1
1. LITTLE INTEREST OR PLEASURE IN DOING THINGS: NOT AT ALL
SUM OF ALL RESPONSES TO PHQ9 QUESTIONS 1 & 2: 1
SUM OF ALL RESPONSES TO PHQ QUESTIONS 1-9: 1

## 2025-02-20 ASSESSMENT — ENCOUNTER SYMPTOMS
BACK PAIN: 1
COUGH: 0

## 2025-02-20 NOTE — PROGRESS NOTES
Christopher Lawson (:  1995) is a 29 y.o. male,Established patient, here for evaluation of the following chief complaint(s):  Medication Check        Assessment & Plan   1. Chronic midline low back pain with bilateral sciatica  - Chronic, controlled  - Improving  - Continue prn naproxen  - Low back exercises encouraged  -     naproxen (NAPROSYN) 250 MG tablet; Take 2 tablets by mouth 2 times daily as needed for Pain, Disp-100 tablet, R-5Normal    2. Concentration deficit  - Chronic, controlled  - Continue bupropion  - High intensity aerobic exercise (3-5x per week for 45-60 minutes at a time) as tolerated, stress management techniques, yoga, the use of relaxation techniques and positive activities encouraged  -     buPROPion (WELLBUTRIN XL) 300 MG extended release tablet; Take 1 tablet by mouth every morning, Disp-90 tablet, R-2Normal    3. Dysphoric mood  - Chronic, controlled  - Continue bupropion  - High intensity aerobic exercise (3-5x per week for 45-60 minutes at a time) as tolerated, stress management techniques, yoga, the use of relaxation techniques and positive activities encouraged  -     buPROPion (WELLBUTRIN XL) 300 MG extended release tablet; Take 1 tablet by mouth every morning, Disp-90 tablet, R-2Normal  -     buPROPion (WELLBUTRIN XL) 300 MG extended release tablet; Take 1 tablet by mouth every morning, Disp-90 tablet, R-2Normal    4. Anxiety  - Chronic, controlled  - Continue buspar  - High intensity aerobic exercise (3-5x per week for 45-60 minutes at a time) as tolerated, stress management techniques, yoga, the use of relaxation techniques and positive activities encouraged  -     busPIRone (BUSPAR) 10 MG tablet; Take 1 tablet by mouth 3 times daily, Disp-270 tablet, R-2Normal    5. Family history of diabetes mellitus  - blood work ordered per patient request  - Low likelihood of DM  -     Hemoglobin A1C; Future      Return in about 9 months (around 2025) for Annual Well Visit.

## 2025-05-19 ENCOUNTER — LAB (OUTPATIENT)
Dept: LAB | Age: 30
End: 2025-05-19

## 2025-05-19 ENCOUNTER — RESULTS FOLLOW-UP (OUTPATIENT)
Dept: FAMILY MEDICINE CLINIC | Age: 30
End: 2025-05-19

## 2025-05-19 DIAGNOSIS — Z83.3 FAMILY HISTORY OF DIABETES MELLITUS: ICD-10-CM

## 2025-05-19 LAB
DEPRECATED MEAN GLUCOSE BLD GHB EST-ACNC: 105 MG/DL (ref 70–126)
HBA1C MFR BLD HPLC: 5.5 % (ref 4–6)